# Patient Record
Sex: FEMALE | Race: WHITE | NOT HISPANIC OR LATINO | Employment: FULL TIME | URBAN - NONMETROPOLITAN AREA
[De-identification: names, ages, dates, MRNs, and addresses within clinical notes are randomized per-mention and may not be internally consistent; named-entity substitution may affect disease eponyms.]

---

## 2017-03-17 RX ORDER — LISINOPRIL 5 MG/1
TABLET ORAL
Qty: 90 TABLET | Refills: 3 | Status: SHIPPED | OUTPATIENT
Start: 2017-03-17 | End: 2018-03-24 | Stop reason: SDUPTHER

## 2017-05-31 ENCOUNTER — OFFICE VISIT (OUTPATIENT)
Dept: INTERNAL MEDICINE | Facility: CLINIC | Age: 52
End: 2017-05-31

## 2017-05-31 VITALS
BODY MASS INDEX: 38.79 KG/M2 | TEMPERATURE: 97.7 F | SYSTOLIC BLOOD PRESSURE: 120 MMHG | OXYGEN SATURATION: 97 % | HEIGHT: 64 IN | WEIGHT: 227.19 LBS | RESPIRATION RATE: 16 BRPM | HEART RATE: 88 BPM | DIASTOLIC BLOOD PRESSURE: 82 MMHG

## 2017-05-31 DIAGNOSIS — H65.92 LEFT OTITIS MEDIA WITH EFFUSION: ICD-10-CM

## 2017-05-31 DIAGNOSIS — J30.9 ALLERGIC RHINITIS, UNSPECIFIED ALLERGIC RHINITIS TRIGGER, UNSPECIFIED RHINITIS SEASONALITY: Primary | ICD-10-CM

## 2017-05-31 DIAGNOSIS — J01.90 ACUTE SINUSITIS, RECURRENCE NOT SPECIFIED, UNSPECIFIED LOCATION: ICD-10-CM

## 2017-05-31 PROCEDURE — 99213 OFFICE O/P EST LOW 20 MIN: CPT | Performed by: NURSE PRACTITIONER

## 2017-05-31 RX ORDER — AZITHROMYCIN 500 MG/1
500 TABLET, FILM COATED ORAL DAILY
Qty: 5 TABLET | Refills: 0 | Status: SHIPPED | OUTPATIENT
Start: 2017-05-31 | End: 2017-06-05

## 2018-01-18 ENCOUNTER — TRANSCRIBE ORDERS (OUTPATIENT)
Dept: ADMINISTRATIVE | Facility: HOSPITAL | Age: 53
End: 2018-01-18

## 2018-01-18 DIAGNOSIS — Z12.39 SCREENING BREAST EXAMINATION: Primary | ICD-10-CM

## 2018-02-09 ENCOUNTER — APPOINTMENT (OUTPATIENT)
Dept: MAMMOGRAPHY | Facility: HOSPITAL | Age: 53
End: 2018-02-09

## 2018-03-06 ENCOUNTER — HOSPITAL ENCOUNTER (OUTPATIENT)
Dept: MAMMOGRAPHY | Facility: HOSPITAL | Age: 53
Discharge: HOME | End: 2018-03-06
Admitting: PHYSICIAN ASSISTANT

## 2018-03-06 DIAGNOSIS — Z12.39 SCREENING BREAST EXAMINATION: ICD-10-CM

## 2018-03-06 PROCEDURE — 77063 BREAST TOMOSYNTHESIS BI: CPT

## 2018-03-06 PROCEDURE — 77067 SCR MAMMO BI INCL CAD: CPT

## 2018-03-26 RX ORDER — LISINOPRIL 5 MG/1
TABLET ORAL
Qty: 90 TABLET | Refills: 3 | Status: SHIPPED | OUTPATIENT
Start: 2018-03-26 | End: 2019-06-17 | Stop reason: SDUPTHER

## 2018-03-29 DIAGNOSIS — Z20.828 EXPOSURE TO THE FLU: Primary | ICD-10-CM

## 2018-03-29 RX ORDER — OSELTAMIVIR PHOSPHATE 75 MG/1
75 CAPSULE ORAL DAILY
Qty: 10 CAPSULE | Refills: 0 | Status: SHIPPED | OUTPATIENT
Start: 2018-03-29 | End: 2018-09-07

## 2018-09-07 ENCOUNTER — OFFICE VISIT (OUTPATIENT)
Dept: INTERNAL MEDICINE | Facility: CLINIC | Age: 53
End: 2018-09-07

## 2018-09-07 VITALS
OXYGEN SATURATION: 98 % | DIASTOLIC BLOOD PRESSURE: 62 MMHG | SYSTOLIC BLOOD PRESSURE: 124 MMHG | TEMPERATURE: 97.5 F | HEART RATE: 68 BPM

## 2018-09-07 DIAGNOSIS — F32.A ANXIETY AND DEPRESSION: ICD-10-CM

## 2018-09-07 DIAGNOSIS — F41.9 ANXIETY AND DEPRESSION: ICD-10-CM

## 2018-09-07 DIAGNOSIS — I10 ESSENTIAL HYPERTENSION: ICD-10-CM

## 2018-09-07 DIAGNOSIS — J30.1 CHRONIC SEASONAL ALLERGIC RHINITIS DUE TO POLLEN: ICD-10-CM

## 2018-09-07 DIAGNOSIS — Z00.00 PHYSICAL EXAM, ANNUAL: Primary | ICD-10-CM

## 2018-09-07 DIAGNOSIS — K21.9 GASTROESOPHAGEAL REFLUX DISEASE WITHOUT ESOPHAGITIS: ICD-10-CM

## 2018-09-07 LAB
ALBUMIN SERPL-MCNC: 4.3 G/DL (ref 3.5–5)
ALBUMIN/GLOB SERPL: 1.6 G/DL (ref 1–2)
ALP SERPL-CCNC: 90 U/L (ref 38–126)
ALT SERPL-CCNC: 42 U/L (ref 13–69)
AST SERPL-CCNC: 36 U/L (ref 15–46)
BASOPHILS # BLD AUTO: 0.05 10*3/MM3 (ref 0–0.2)
BASOPHILS NFR BLD AUTO: 0.9 % (ref 0–2.5)
BILIRUB SERPL-MCNC: 0.9 MG/DL (ref 0.2–1.3)
BUN SERPL-MCNC: 15 MG/DL (ref 7–20)
BUN/CREAT SERPL: 25 (ref 7.1–23.5)
CALCIUM SERPL-MCNC: 9.3 MG/DL (ref 8.4–10.2)
CHLORIDE SERPL-SCNC: 104 MMOL/L (ref 98–107)
CHOLEST SERPL-MCNC: 165 MG/DL (ref 0–199)
CHOLEST/HDLC SERPL: 2.23 {RATIO}
CO2 SERPL-SCNC: 25 MMOL/L (ref 26–30)
CREAT SERPL-MCNC: 0.6 MG/DL (ref 0.6–1.3)
EOSINOPHIL # BLD AUTO: 0.17 10*3/MM3 (ref 0–0.7)
EOSINOPHIL NFR BLD AUTO: 3 % (ref 0–7)
ERYTHROCYTE [DISTWIDTH] IN BLOOD BY AUTOMATED COUNT: 12.1 % (ref 11.5–14.5)
GLOBULIN SER CALC-MCNC: 2.7 GM/DL
GLUCOSE SERPL-MCNC: 91 MG/DL (ref 74–98)
HBA1C MFR BLD: 5.3 %
HCT VFR BLD AUTO: 38.1 % (ref 37–47)
HDLC SERPL-MCNC: 74 MG/DL (ref 40–60)
HGB BLD-MCNC: 12.8 G/DL (ref 12–16)
IMM GRANULOCYTES # BLD: 0.02 10*3/MM3 (ref 0–0.06)
IMM GRANULOCYTES NFR BLD: 0.4 % (ref 0–0.6)
LDLC SERPL CALC-MCNC: 62 MG/DL (ref 0–99)
LYMPHOCYTES # BLD AUTO: 2.69 10*3/MM3 (ref 0.6–3.4)
LYMPHOCYTES NFR BLD AUTO: 47.7 % (ref 10–50)
MCH RBC QN AUTO: 29.8 PG (ref 27–31)
MCHC RBC AUTO-ENTMCNC: 33.6 G/DL (ref 30–37)
MCV RBC AUTO: 88.6 FL (ref 81–99)
MONOCYTES # BLD AUTO: 0.37 10*3/MM3 (ref 0–0.9)
MONOCYTES NFR BLD AUTO: 6.6 % (ref 0–12)
NEUTROPHILS # BLD AUTO: 2.34 10*3/MM3 (ref 2–6.9)
NEUTROPHILS NFR BLD AUTO: 41.4 % (ref 37–80)
NRBC BLD AUTO-RTO: 0 /100 WBC (ref 0–0)
PLATELET # BLD AUTO: 233 10*3/MM3 (ref 130–400)
POTASSIUM SERPL-SCNC: 4.4 MMOL/L (ref 3.5–5.1)
PROT SERPL-MCNC: 7 G/DL (ref 6.3–8.2)
RBC # BLD AUTO: 4.3 10*6/MM3 (ref 4.2–5.4)
SODIUM SERPL-SCNC: 137 MMOL/L (ref 137–145)
TRIGL SERPL-MCNC: 143 MG/DL
TSH SERPL DL<=0.005 MIU/L-ACNC: 1.4 MIU/ML (ref 0.47–4.68)
VLDLC SERPL CALC-MCNC: 28.6 MG/DL
WBC # BLD AUTO: 5.64 10*3/MM3 (ref 4.8–10.8)

## 2018-09-07 PROCEDURE — 99396 PREV VISIT EST AGE 40-64: CPT | Performed by: INTERNAL MEDICINE

## 2018-09-07 RX ORDER — METHYLPREDNISOLONE 4 MG/1
TABLET ORAL
COMMUNITY
Start: 2018-06-11 | End: 2018-09-07

## 2018-09-07 RX ORDER — CITALOPRAM 20 MG/1
20 TABLET ORAL DAILY
Qty: 30 TABLET | Refills: 3 | Status: SHIPPED | OUTPATIENT
Start: 2018-09-07 | End: 2019-12-31

## 2018-09-07 NOTE — PROGRESS NOTES
Chief Complaint   Patient presents with   • Annual Exam     Physical only. Pt states she's had a really bad headache and sinus pressure today.     Subjective   Tracie Argueta is a 53 y.o. female.     Pt is here for annual exam today.  PMH of HTN, depression and allergies.   HCM- Mammogram March 2018, colonoscopy 2016.   She has been having allergy sxs recently.  She is taking her xyzal, but has a HA past few days.  Has rinsed with her Netti pot, some bloody discharge, clear discharge.  No EA or ST, her ears are congested.   Her BP is well controlled.  Her home reads run about the same. No CP, SOA, edema.   No current GERD sxs.   She has been having some anxiety at night.  Her ex  committed suicide in July.  She is worried about her daughter.  Has some palpitations.  She may have some mild depression starting.    Her father has some CHF with known history of coronary artery disease and carotid artery disease-status post CABG and carotid endarterectomy.  She is concerned about her heart now due to her palpitations and her fathers illness.   She is not eating a healthy diet.  Is eating a lot of sugar and carbs.  She is walking on occasion.    She sees dentist twice a yr.  Last eye exam was about 5 yrs ago.    She does wear her seatbelt.  Does not text and drive.          The following portions of the patient's history were reviewed and updated as appropriate: allergies, current medications, past family history, past medical history, past social history, past surgical history and problem list.    Review of Systems   Constitutional: Negative for chills and fever.   HENT: Positive for sinus pressure. Negative for ear pain and sore throat.    Respiratory: Negative for shortness of breath.    Cardiovascular: Positive for palpitations. Negative for chest pain and leg swelling.   Gastrointestinal: Negative.    Allergic/Immunologic: Positive for environmental allergies.   Neurological: Positive for headaches.    Psychiatric/Behavioral: Positive for dysphoric mood and sleep disturbance. The patient is nervous/anxious.    All other systems reviewed and are negative.      Objective   /62   Pulse 68   Temp 97.5 °F (36.4 °C)   SpO2 98%   There is no height or weight on file to calculate BMI.  Physical Exam   Constitutional: She is oriented to person, place, and time. She appears well-developed and well-nourished.   Pleasant female, seated in dark room due to current headache   HENT:   Head: Normocephalic and atraumatic.   Right Ear: External ear normal.   Left Ear: External ear normal.   Mouth/Throat: Oropharynx is clear and moist.   Left tympanic membrane is dull and slightly bulging compared to right TM, turbinates pale and boggy with clear rhinorrhea, mild maxillary sinus tenderness   Eyes: Pupils are equal, round, and reactive to light. Conjunctivae and EOM are normal.   Neck: Normal range of motion. Neck supple. No thyromegaly present.   Cardiovascular: Normal rate, regular rhythm, normal heart sounds and intact distal pulses.    No murmur heard.  No carotid bruits noted   Pulmonary/Chest: Effort normal and breath sounds normal. No respiratory distress.   Abdominal: Soft. Bowel sounds are normal.   Musculoskeletal: She exhibits no edema.   Lymphadenopathy:     She has no cervical adenopathy.   Neurological: She is alert and oriented to person, place, and time. No cranial nerve deficit.   Psychiatric: Her behavior is normal. Judgment and thought content normal.   Patient appears slightly anxious today   Nursing note and vitals reviewed.      Assessment/Plan   Tracie Argueta is here today and the following problems have been addressed:      Tracie was seen today for annual exam.    Diagnoses and all orders for this visit:    Physical exam, annual  -     CBC & Differential  -     Comprehensive Metabolic Panel  -     Hemoglobin A1c  -     Lipid Panel With / Chol / HDL Ratio  -     TSH    Essential hypertension  -      CBC & Differential  -     Comprehensive Metabolic Panel    Gastroesophageal reflux disease without esophagitis    Anxiety and depression  -     TSH    Chronic seasonal allergic rhinitis due to pollen    Other orders  -     citalopram (CeleXA) 20 MG tablet; Take 1 tablet by mouth Daily.    Labs as noted  Recommend avoidance of processed foods  Exercise for 30 minutes 5 days a week as tolerated  Recommend annual eye doctor appointment, or as necessary  See your dentist twice a year.  Recommend that you brush teeth twice daily and floss minimum of once daily  Recommend regular seatbelt use  Do not text or use phone while driving  Follow heart healthy/low salt diet  Monitor blood pressure as discussed  Take all medications as prescribed  Start Celexa at 10 mg daily for 1 week, then increase to 20 mg daily for underlying depression and anxiety symptoms  Continue Xyzal daily for allergies, but take Sudafed 30 mg once daily for 3-5 days due to underlying congestion and left ear fullness  Counseled patient that her current palpitations are likely due to underlying anxiety-if symptoms persist despite treatment of anxiety will consider stress test at follow-up visit  Reflux symptoms are currently well controlled with healthy diet    Return to clinic in 6 weeks for follow-up of depression and anxiety        Please note that portions of this note were completed with a voice recognition program.  Efforts were made to edit dictation, but occasionally words are mistranscribed.

## 2018-09-11 ENCOUNTER — TELEPHONE (OUTPATIENT)
Dept: INTERNAL MEDICINE | Facility: CLINIC | Age: 53
End: 2018-09-11

## 2018-09-12 RX ORDER — SULFAMETHOXAZOLE AND TRIMETHOPRIM 800; 160 MG/1; MG/1
1 TABLET ORAL 2 TIMES DAILY
Qty: 14 TABLET | Refills: 0 | Status: SHIPPED | OUTPATIENT
Start: 2018-09-12 | End: 2018-09-19

## 2018-11-13 ENCOUNTER — OFFICE VISIT (OUTPATIENT)
Dept: INTERNAL MEDICINE | Facility: CLINIC | Age: 53
End: 2018-11-13

## 2018-11-13 VITALS
OXYGEN SATURATION: 95 % | HEART RATE: 72 BPM | DIASTOLIC BLOOD PRESSURE: 80 MMHG | TEMPERATURE: 97.4 F | SYSTOLIC BLOOD PRESSURE: 128 MMHG

## 2018-11-13 DIAGNOSIS — F41.9 ANXIETY AND DEPRESSION: Primary | ICD-10-CM

## 2018-11-13 DIAGNOSIS — F32.A ANXIETY AND DEPRESSION: Primary | ICD-10-CM

## 2018-11-13 PROCEDURE — 99213 OFFICE O/P EST LOW 20 MIN: CPT | Performed by: INTERNAL MEDICINE

## 2018-11-13 NOTE — PROGRESS NOTES
Chief Complaint   Patient presents with   • Follow-up     6 weeks for depression and anxiety. Pt states Celexa seems be working good.      Subjective   Tracie Argueta is a 53 y.o. female.     Pt here today for follow up of anxiety and depression.  She was started on celexa 10 mg daily for one week, then up to 20 mg daily.   She is not having any side effects.  She has less anxiety.  If she is around her daughter at times, her anxiety will cause pt to feel more anxious.   She is sleeping well.  Has a good appetite.  She is trying to drink a lot of water.  Becoming more active, trying to exercise.             The following portions of the patient's history were reviewed and updated as appropriate: allergies, current medications, past family history, past medical history, past social history, past surgical history and problem list.    Review of Systems   Constitutional: Negative for activity change and appetite change.   Psychiatric/Behavioral: Negative for dysphoric mood and sleep disturbance. The patient is not nervous/anxious.        Objective   /80   Pulse 72   Temp 97.4 °F (36.3 °C)   SpO2 95%   There is no height or weight on file to calculate BMI.  Physical Exam   Constitutional: She is oriented to person, place, and time. She appears well-developed and well-nourished.   HENT:   Head: Normocephalic and atraumatic.   Eyes: EOM are normal. Pupils are equal, round, and reactive to light.   Pulmonary/Chest: Effort normal.   Neurological: She is alert and oriented to person, place, and time.   Psychiatric: Her behavior is normal. Judgment and thought content normal.   Overall mood is much improved with less anxiety noted today during interview   Nursing note and vitals reviewed.      Assessment/Plan   Tracie Argueta is here today and the following problems have been addressed:      Tracie was seen today for follow-up.    Diagnoses and all orders for this visit:    Anxiety and depression    continue celexa at  current dose  Recommend Hep A and shingrix vaccines at pharmacy    RTC 3 mo for routine visit    Please note that portions of this note were completed with a voice recognition program.  Efforts were made to edit dictation, but occasionally words are mistranscribed.

## 2019-01-08 ENCOUNTER — PROCEDURE VISIT (OUTPATIENT)
Dept: OBSTETRICS AND GYNECOLOGY | Facility: CLINIC | Age: 54
End: 2019-01-08

## 2019-01-08 VITALS
WEIGHT: 250.2 LBS | HEIGHT: 64 IN | SYSTOLIC BLOOD PRESSURE: 146 MMHG | DIASTOLIC BLOOD PRESSURE: 88 MMHG | BODY MASS INDEX: 42.72 KG/M2

## 2019-01-08 DIAGNOSIS — N95.1 MENOPAUSAL STATE: ICD-10-CM

## 2019-01-08 DIAGNOSIS — N90.89 VULVAR IRRITATION: ICD-10-CM

## 2019-01-08 DIAGNOSIS — Z12.72 SPECIAL SCREENING FOR MALIGNANT NEOPLASM OF VAGINA: ICD-10-CM

## 2019-01-08 DIAGNOSIS — Z01.411 ENCOUNTER FOR GYNECOLOGICAL EXAMINATION WITH ABNORMAL FINDING: Primary | ICD-10-CM

## 2019-01-08 PROCEDURE — 99396 PREV VISIT EST AGE 40-64: CPT | Performed by: PHYSICIAN ASSISTANT

## 2019-01-08 RX ORDER — HEPATITIS A VACCINE 1440 [IU]/ML
INJECTION, SUSPENSION INTRAMUSCULAR
Refills: 1 | COMMUNITY
Start: 2018-12-08 | End: 2019-12-31

## 2019-01-08 RX ORDER — CLOBETASOL PROPIONATE 0.5 MG/G
CREAM TOPICAL
Qty: 30 G | Refills: 0 | Status: SHIPPED | OUTPATIENT
Start: 2019-01-08 | End: 2020-08-25

## 2019-01-08 NOTE — PROGRESS NOTES
Subjective   Chief Complaint   Patient presents with   • Gynecologic Exam     Pap is due; MMG is due in March; C/O vaginal dryness       Tracie Argueta is a 53 y.o. year old  presenting to be seen for her annual gynecological exam.   She has had previous hysterectomy and BSO . Did not use HRT post surgery.   She reports her last pap was about 5 years ago.   She has been having some vaginal dryness and dryness with intercourse but not significant at this time. Is not using any lubricants     She also reports right labia has been itchy for about 2 months off and on. Itching occurs about q 2 weeks but very itchy when flares up. No vaginal discharge  Screening mammogram is due in March       Past Medical History:   Diagnosis Date   • Acute otitis media    • Acute sinusitis    • Acute UTI    • Dysfunction of eustachian tube     right    • H/O bone density study    • History of mammogram    • History of Papanicolaou smear of cervix      [place]   • Hypertension    • Meniscus tear    • Pain in thoracic spine    • Postsurgical ovarian failure    • Urinary symptom or sign    • URTI (acute upper respiratory infection)         Current Outpatient Medications:   •  Cholecalciferol (VITAMIN D-3) 1000 UNITS capsule, Take  by mouth daily., Disp: , Rfl:   •  HAVRIX 1440 EL U/ML vaccine, ADMINISTER VACCINE PER PHYSICIAN PROTOCOL, Disp: , Rfl: 1  •  levocetirizine (XYZAL) 5 MG tablet, , Disp: , Rfl:   •  lisinopril (PRINIVIL,ZESTRIL) 5 MG tablet, TAKE 1 TABLET DAILY, Disp: 90 tablet, Rfl: 3  •  MULTIPLE VITAMIN PO, Take  by mouth daily., Disp: , Rfl:   •  citalopram (CeleXA) 20 MG tablet, Take 1 tablet by mouth Daily., Disp: 30 tablet, Rfl: 3  •  clobetasol (TEMOVATE) 0.05 % cream, Apply bid x 1 week then qd x 1 week then qod x 2 weeks, Disp: 30 g, Rfl: 0   Allergies   Allergen Reactions   • Penicillins Rash      Past Surgical History:   Procedure Laterality Date   •  SECTION     • HYSTERECTOMY      Total Abdominal  "  • LAPAROSCOPIC ASSISTED VAGINAL HYSTERECTOMY SALPINGO OOPHORECTOMY     • OOPHORECTOMY        Social History     Socioeconomic History   • Marital status:      Spouse name: Not on file   • Number of children: Not on file   • Years of education: Not on file   • Highest education level: Not on file   Social Needs   • Financial resource strain: Not on file   • Food insecurity - worry: Not on file   • Food insecurity - inability: Not on file   • Transportation needs - medical: Not on file   • Transportation needs - non-medical: Not on file   Occupational History   • Not on file   Tobacco Use   • Smoking status: Never Smoker   • Smokeless tobacco: Never Used   Substance and Sexual Activity   • Alcohol use: No   • Drug use: No   • Sexual activity: Yes     Partners: Male     Birth control/protection: Surgical   Other Topics Concern   • Not on file   Social History Narrative   • Not on file      Family History   Problem Relation Age of Onset   • Cancer Mother    • Colon cancer Mother    • Thyroid disease Mother    • Heart attack Father    • Thyroid disease Brother        Review of Systems   Constitutional: Negative.    Gastrointestinal: Negative.    Genitourinary: Positive for dyspareunia and vaginal pain. Negative for difficulty urinating, pelvic pain and vaginal discharge.           Objective   /88   Ht 162.6 cm (64\")   Wt 113 kg (250 lb 3.2 oz)   Breastfeeding? No   BMI 42.95 kg/m²     Physical Exam   Constitutional: She appears well-developed and well-nourished. She is cooperative.   Pulmonary/Chest: Right breast exhibits no inverted nipple, no mass, no nipple discharge, no skin change and no tenderness. Left breast exhibits no inverted nipple, no mass, no nipple discharge, no skin change and no tenderness.   Abdominal: Soft. Normal appearance. There is no tenderness.   Genitourinary: Uterus normal. There is no tenderness or lesion on the right labia. There is no tenderness or lesion on the left labia. " Cervix exhibits no motion tenderness and no discharge. Right adnexum displays no mass and no tenderness. Left adnexum displays no mass and no tenderness.   Genitourinary Comments: Right labia majora with very faint blanched white discoloration. No lesions or excoriation noted  Vaginal walls atrophic    Neurological: She is alert.   Skin: Skin is warm and dry.   Psychiatric: She has a normal mood and affect. Her behavior is normal.            Assessment and Plan  Tracie was seen today for gynecologic exam.    Diagnoses and all orders for this visit:    Encounter for gynecological examination with abnormal finding  -     Mammo Screening Digital Tomosynthesis Bilateral With CAD; Future    Special screening for malignant neoplasm of vagina  -     Liquid-based Pap Smear, Screening; Future    Menopausal state  -     DEXA Bone Density Axial; Future    Vulvar irritation    Other orders  -     clobetasol (TEMOVATE) 0.05 % cream; Apply bid x 1 week then qd x 1 week then qod x 2 weeks      Patient Instructions   Self breast exam monthly  Annual mammogram  Calcium 1200 mg daily and vit D 2000 mg daily  Regular exercise  Discussed option of vaginal estrogen for vaginal dryness but at this time patient will try OTC lubricants    Suspect lichen sclerosis of right labia clinically so offered patient option of trial of clobetasol cream and she would like to try this. She is advised if vulvar itching not resolved completely with course of clobetasol will discuss possible vulvar punch biopsy    Follow up in 6 weeks              This note was electronically signed.    Tracie Mcmahon PA-C   January 8, 2019

## 2019-01-08 NOTE — PATIENT INSTRUCTIONS
Self breast exam monthly  Annual mammogram  Calcium 1200 mg daily and vit D 2000 mg daily  Regular exercise  Discussed option of vaginal estrogen for vaginal dryness but at this time patient will try OTC lubricants    Suspect lichen sclerosis of right labia clinically so offered patient option of trial of clobetasol cream and she would like to try this. She is advised if vulvar itching not resolved completely with course of clobetasol will discuss possible vulvar punch biopsy    Follow up in 6 weeks

## 2019-01-14 ENCOUNTER — APPOINTMENT (OUTPATIENT)
Dept: BONE DENSITY | Facility: HOSPITAL | Age: 54
End: 2019-01-14

## 2019-01-16 DIAGNOSIS — Z12.72 SPECIAL SCREENING FOR MALIGNANT NEOPLASM OF VAGINA: ICD-10-CM

## 2019-03-21 ENCOUNTER — HOSPITAL ENCOUNTER (OUTPATIENT)
Dept: MAMMOGRAPHY | Facility: HOSPITAL | Age: 54
Discharge: HOME | End: 2019-03-21
Admitting: PHYSICIAN ASSISTANT

## 2019-03-21 DIAGNOSIS — Z01.411 ENCOUNTER FOR GYNECOLOGICAL EXAMINATION WITH ABNORMAL FINDING: ICD-10-CM

## 2019-03-21 PROCEDURE — 77067 SCR MAMMO BI INCL CAD: CPT

## 2019-03-21 PROCEDURE — 77063 BREAST TOMOSYNTHESIS BI: CPT

## 2019-04-02 ENCOUNTER — TELEPHONE (OUTPATIENT)
Dept: OBSTETRICS AND GYNECOLOGY | Facility: CLINIC | Age: 54
End: 2019-04-02

## 2019-04-02 NOTE — TELEPHONE ENCOUNTER
Tried to contact patient regarding overdue DEXA. No answer, left message for patient to call office and order postponed.

## 2019-04-18 NOTE — TELEPHONE ENCOUNTER
Pt called back and states, she works at [facility] and had DEXA done there as part of her wellness program. They would not provide a paper copy for her but she was able to take a picture of it with her phone. I suggested to pt she upload the picture through a message on [employer] and we could forward it to Zoya to be reviewed.

## 2019-06-17 RX ORDER — LISINOPRIL 5 MG/1
5 TABLET ORAL DAILY
Qty: 90 TABLET | Refills: 0 | Status: SHIPPED | OUTPATIENT
Start: 2019-06-17 | End: 2019-11-15 | Stop reason: SDUPTHER

## 2019-11-15 RX ORDER — LISINOPRIL 5 MG/1
5 TABLET ORAL DAILY
Qty: 90 TABLET | Refills: 0 | Status: SHIPPED | OUTPATIENT
Start: 2019-11-15 | End: 2020-02-14

## 2019-12-31 ENCOUNTER — OFFICE VISIT (OUTPATIENT)
Dept: INTERNAL MEDICINE | Facility: CLINIC | Age: 54
End: 2019-12-31

## 2019-12-31 VITALS
DIASTOLIC BLOOD PRESSURE: 80 MMHG | TEMPERATURE: 97.6 F | OXYGEN SATURATION: 96 % | SYSTOLIC BLOOD PRESSURE: 128 MMHG | HEIGHT: 64 IN | HEART RATE: 82 BPM | BODY MASS INDEX: 39.44 KG/M2 | WEIGHT: 231 LBS

## 2019-12-31 DIAGNOSIS — I10 ESSENTIAL HYPERTENSION: ICD-10-CM

## 2019-12-31 DIAGNOSIS — M17.12 PRIMARY OSTEOARTHRITIS OF LEFT KNEE: ICD-10-CM

## 2019-12-31 DIAGNOSIS — E66.01 MORBID OBESITY (HCC): ICD-10-CM

## 2019-12-31 DIAGNOSIS — F34.1 DYSTHYMIA: ICD-10-CM

## 2019-12-31 DIAGNOSIS — Z00.00 PHYSICAL EXAM, ANNUAL: Primary | ICD-10-CM

## 2019-12-31 PROBLEM — F41.9 ANXIETY AND DEPRESSION: Status: RESOLVED | Noted: 2018-09-07 | Resolved: 2019-12-31

## 2019-12-31 PROBLEM — F32.A ANXIETY AND DEPRESSION: Status: RESOLVED | Noted: 2018-09-07 | Resolved: 2019-12-31

## 2019-12-31 LAB
ALBUMIN SERPL-MCNC: 4.4 G/DL (ref 3.5–5.2)
ALBUMIN/GLOB SERPL: 1.6 G/DL
ALP SERPL-CCNC: 92 U/L (ref 39–117)
ALT SERPL-CCNC: 21 U/L (ref 1–33)
AST SERPL-CCNC: 24 U/L (ref 1–32)
BASOPHILS # BLD AUTO: 0.03 10*3/MM3 (ref 0–0.2)
BASOPHILS NFR BLD AUTO: 0.8 % (ref 0–1.5)
BILIRUB SERPL-MCNC: 0.8 MG/DL (ref 0.2–1.2)
BUN SERPL-MCNC: 14 MG/DL (ref 6–20)
BUN/CREAT SERPL: 21.9 (ref 7–25)
CALCIUM SERPL-MCNC: 9.2 MG/DL (ref 8.6–10.5)
CHLORIDE SERPL-SCNC: 101 MMOL/L (ref 98–107)
CHOLEST SERPL-MCNC: 180 MG/DL (ref 0–200)
CHOLEST/HDLC SERPL: 2.54 {RATIO}
CO2 SERPL-SCNC: 25.3 MMOL/L (ref 22–29)
CREAT SERPL-MCNC: 0.64 MG/DL (ref 0.57–1)
EOSINOPHIL # BLD AUTO: 0.08 10*3/MM3 (ref 0–0.4)
EOSINOPHIL NFR BLD AUTO: 2.2 % (ref 0.3–6.2)
ERYTHROCYTE [DISTWIDTH] IN BLOOD BY AUTOMATED COUNT: 12.6 % (ref 12.3–15.4)
GLOBULIN SER CALC-MCNC: 2.7 GM/DL
GLUCOSE SERPL-MCNC: 96 MG/DL (ref 65–99)
HCT VFR BLD AUTO: 36.8 % (ref 34–46.6)
HDLC SERPL-MCNC: 71 MG/DL (ref 40–60)
HGB BLD-MCNC: 12.8 G/DL (ref 12–15.9)
IMM GRANULOCYTES # BLD AUTO: 0.01 10*3/MM3 (ref 0–0.05)
IMM GRANULOCYTES NFR BLD AUTO: 0.3 % (ref 0–0.5)
LDLC SERPL CALC-MCNC: 79 MG/DL (ref 0–100)
LYMPHOCYTES # BLD AUTO: 1.39 10*3/MM3 (ref 0.7–3.1)
LYMPHOCYTES NFR BLD AUTO: 37.9 % (ref 19.6–45.3)
MCH RBC QN AUTO: 30.7 PG (ref 26.6–33)
MCHC RBC AUTO-ENTMCNC: 34.8 G/DL (ref 31.5–35.7)
MCV RBC AUTO: 88.2 FL (ref 79–97)
MONOCYTES # BLD AUTO: 0.24 10*3/MM3 (ref 0.1–0.9)
MONOCYTES NFR BLD AUTO: 6.5 % (ref 5–12)
NEUTROPHILS # BLD AUTO: 1.92 10*3/MM3 (ref 1.7–7)
NEUTROPHILS NFR BLD AUTO: 52.3 % (ref 42.7–76)
NRBC BLD AUTO-RTO: 0 /100 WBC (ref 0–0.2)
PLATELET # BLD AUTO: 239 10*3/MM3 (ref 140–450)
POTASSIUM SERPL-SCNC: 4.7 MMOL/L (ref 3.5–5.2)
PROT SERPL-MCNC: 7.1 G/DL (ref 6–8.5)
RBC # BLD AUTO: 4.17 10*6/MM3 (ref 3.77–5.28)
SODIUM SERPL-SCNC: 140 MMOL/L (ref 136–145)
TRIGL SERPL-MCNC: 148 MG/DL (ref 0–150)
VLDLC SERPL CALC-MCNC: 29.6 MG/DL
WBC # BLD AUTO: 3.67 10*3/MM3 (ref 3.4–10.8)

## 2019-12-31 PROCEDURE — 90471 IMMUNIZATION ADMIN: CPT | Performed by: INTERNAL MEDICINE

## 2019-12-31 PROCEDURE — 90686 IIV4 VACC NO PRSV 0.5 ML IM: CPT | Performed by: INTERNAL MEDICINE

## 2019-12-31 PROCEDURE — 99396 PREV VISIT EST AGE 40-64: CPT | Performed by: INTERNAL MEDICINE

## 2019-12-31 RX ORDER — DULOXETIN HYDROCHLORIDE 30 MG/1
30 CAPSULE, DELAYED RELEASE ORAL DAILY
Qty: 30 CAPSULE | Refills: 2 | Status: SHIPPED | OUTPATIENT
Start: 2019-12-31 | End: 2019-12-31

## 2019-12-31 RX ORDER — DULOXETIN HYDROCHLORIDE 30 MG/1
30 CAPSULE, DELAYED RELEASE ORAL DAILY
Qty: 30 CAPSULE | Refills: 2 | Status: SHIPPED | OUTPATIENT
Start: 2019-12-31 | End: 2020-02-13

## 2019-12-31 RX ORDER — MELOXICAM 5 MG/1
CAPSULE ORAL DAILY
COMMUNITY
End: 2020-03-06
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
## 2020-02-13 ENCOUNTER — OFFICE VISIT (OUTPATIENT)
Dept: INTERNAL MEDICINE | Facility: CLINIC | Age: 55
End: 2020-02-13

## 2020-02-13 VITALS
DIASTOLIC BLOOD PRESSURE: 82 MMHG | HEART RATE: 83 BPM | SYSTOLIC BLOOD PRESSURE: 138 MMHG | WEIGHT: 227 LBS | HEIGHT: 64 IN | TEMPERATURE: 98.1 F | BODY MASS INDEX: 38.76 KG/M2 | OXYGEN SATURATION: 98 %

## 2020-02-13 DIAGNOSIS — F34.1 DYSTHYMIA: ICD-10-CM

## 2020-02-13 DIAGNOSIS — M17.12 PRIMARY OSTEOARTHRITIS OF LEFT KNEE: Primary | ICD-10-CM

## 2020-02-13 PROCEDURE — 99213 OFFICE O/P EST LOW 20 MIN: CPT | Performed by: INTERNAL MEDICINE

## 2020-02-13 RX ORDER — DULOXETIN HYDROCHLORIDE 30 MG/1
30 CAPSULE, DELAYED RELEASE ORAL DAILY
Qty: 90 CAPSULE | Refills: 1 | Status: SHIPPED | OUTPATIENT
Start: 2020-02-13 | End: 2020-09-09

## 2020-02-13 NOTE — PROGRESS NOTES
"Chief Complaint   Patient presents with   • Follow-up     6 weeks for underlying arthritis, back pain and intermittent dysthymia     Subjective   Tracie Argueta is a 54 y.o. female.     Here today for follow up of DJD and dysthymia.   Last visit we started her on duloxetine 30 mg daily for above issues.   After last visit she developed shingles on her scalp.   She started the duloxetine and cannot tell if it has been helpful for her knee or back pain. She feels the medication is helping her depression-less sadness and anxiety.    She is seeing [facility] Dr Nielsen.  She had SI joint injections and epidural and it did not help.   She has appt with Dr Beltrán and has appt for another procedure, sounds like a rhizotomy for her spinal stenosis.    She is having a knee injection per Dr Lei.         The following portions of the patient's history were reviewed and updated as appropriate: allergies, current medications, past family history, past medical history, past social history, past surgical history and problem list.    Review of Systems   Constitutional: Positive for fatigue. Negative for activity change, appetite change and unexpected weight change.   Musculoskeletal: Positive for arthralgias, back pain and gait problem.   Psychiatric/Behavioral: Negative for dysphoric mood, sleep disturbance and suicidal ideas. The patient is not nervous/anxious.        Objective   /82   Pulse 83   Temp 98.1 °F (36.7 °C)   Ht 162.6 cm (64\")   Wt 103 kg (227 lb)   SpO2 98%   BMI 38.96 kg/m²   Body mass index is 38.96 kg/m².  Physical Exam   Constitutional: She is oriented to person, place, and time. She appears well-developed and well-nourished. No distress.   Very pleasant female, NAD today   Eyes: EOM are normal.   Pulmonary/Chest: Effort normal.   Musculoskeletal:   Antalgic gait due to back and knee pain   Neurological: She is alert and oriented to person, place, and time.   Psychiatric: She has a normal " mood and affect. Her behavior is normal. Judgment and thought content normal.   Nursing note and vitals reviewed.      Assessment/Plan   Tracie Argueta is here today and the following problems have been addressed:      Tracie was seen today for follow-up.    Diagnoses and all orders for this visit:    Primary osteoarthritis of left knee    Dysthymia    Other orders  -     DULoxetine (CYMBALTA) 30 MG capsule; Take 1 capsule by mouth Daily.    Continue duloxetine 30 mg daily for DJD and dysthymia  She prefers to continue current dose of medication as her mood is better/stable  She does not feel a higher dose will improve her pain and I tend to agree  She will see pain clinic for procedure on her back and Dr Lei for discussion for possible left TKR    RTC 3 mo or prn    Please note that portions of this note were completed with a voice recognition program.  Efforts were made to edit dictation, but occasionally words are mistranscribed.

## 2020-02-14 RX ORDER — LISINOPRIL 5 MG/1
5 TABLET ORAL DAILY
Qty: 90 TABLET | Refills: 1 | Status: SHIPPED | OUTPATIENT
Start: 2020-02-14 | End: 2020-08-12

## 2020-03-06 ENCOUNTER — OFFICE VISIT (OUTPATIENT)
Dept: INTERNAL MEDICINE | Facility: CLINIC | Age: 55
End: 2020-03-06

## 2020-03-06 VITALS
TEMPERATURE: 97.7 F | SYSTOLIC BLOOD PRESSURE: 130 MMHG | HEIGHT: 64 IN | WEIGHT: 227 LBS | HEART RATE: 88 BPM | BODY MASS INDEX: 38.76 KG/M2 | DIASTOLIC BLOOD PRESSURE: 80 MMHG | OXYGEN SATURATION: 98 %

## 2020-03-06 DIAGNOSIS — Z01.818 PRE-OP EXAM: Primary | ICD-10-CM

## 2020-03-06 PROCEDURE — 93000 ELECTROCARDIOGRAM COMPLETE: CPT | Performed by: INTERNAL MEDICINE

## 2020-03-06 PROCEDURE — 99214 OFFICE O/P EST MOD 30 MIN: CPT | Performed by: INTERNAL MEDICINE

## 2020-03-06 NOTE — PROGRESS NOTES
" Subjective:      Tracie Argueta is a 54 y.o. female who presents to the office today for a preoperative consultation at the request of surgeon Dr Lei who plans on performing left partial TKR on April 10. This consultation is requested for the specific conditions prompting preoperative evaluation general medical clearance. Planned anesthesia is general. The patient has the following known anesthesia issues: none. Patient has a bleeding risk of: no recent abnormal bleeding. Patient does not have objections to receiving blood products if needed.    The following portions of the patient's history were reviewed and updated as appropriate: allergies, current medications, past family history, past medical history, past social history, past surgical history and problem list.    Review of Systems  A comprehensive review of systems was negative except for: Gastrointestinal: positive for constipation  Musculoskeletal: positive for arthralgias       Objective:      Physical Exam  /80   Pulse 88   Temp 97.7 °F (36.5 °C)   Ht 162.6 cm (64\")   Wt 103 kg (227 lb)   SpO2 98%   BMI 38.96 kg/m²   General appearance: alert, appears stated age and cooperative  Eyes: conjunctivae/corneas clear. PERRL, EOM's intact. Fundi benign.  Ears: normal TM's and external ear canals both ears  Throat: lips, mucosa, and tongue normal; teeth and gums normal  Neck: no adenopathy, no carotid bruit, supple, symmetrical, trachea midline and thyroid not enlarged, symmetric, no tenderness/mass/nodules  Lungs: clear to auscultation bilaterally  Heart: regular rate and rhythm, S1, S2 normal, no murmur, click, rub or gallop  Abdomen: soft, non-tender; bowel sounds normal; no masses,  no organomegaly  Extremities: extremities normal, atraumatic, no cyanosis or edema  Pulses: 2+ and symmetric  Lymph nodes: Cervical, supraclavicular, and axillary nodes normal.  Neurologic: Grossly normal    Predictors of intubation " difficulty:  none    Cardiographics    ECG 12 Lead  Date/Time: 3/6/2020 5:24 PM  Performed by: Vermeesch, Marilyn K, MD  Authorized by: Vermeesch, Marilyn K, MD   Comparison: not compared with previous ECG   Rhythm: sinus rhythm  Rate: normal  BPM: 85  Conduction: conduction normal  ST Segments: ST segments normal  T Waves: T waves normal  QRS axis: normal  Other: no other findings    Clinical impression: normal ECG              Imaging  See attached    Lab Review   See attached     Assessment:      54 y.o. female with planned surgery as above.    Known risk factors for perioperative complications: None    Difficulty with intubation is not anticipated.    Cardiac Risk Estimation: per the Revised Cardiac Risk Index (Circ. 100:1043, 1999), the patient's risk factors for cardiac complications include none, putting her in: RCI RISK CLASS I (0 risk factors, risk of major cardiac compl. appr. 0.5%)    Current medications which may produce withdrawal symptoms if withheld perioperatively: duloxetine    Plan:      1. Preoperative workup as follows Labs, EKG and CXR  2. Change in medication regimen before surgery: stop all herbs, vitamins for one week before surgery  3. Prophylaxis for cardiac events with perioperative beta-blockers: not needed  4. Invasive hemodynamic monitoring perioperatively: per surgical team  5. Deep vein thrombosis prophylaxis postoperatively:regimen to be chosen by surgical team.  6. Surveillance for postoperative MI with ECG immediately postoperatively and on postoperative days 1 and 2 AND troponin levels 24 hours postoperatively and on day 4 or hospital discharge (whichever comes first): as indicated

## 2020-03-10 ENCOUNTER — CLINICAL SUPPORT (OUTPATIENT)
Dept: INTERNAL MEDICINE | Facility: CLINIC | Age: 55
End: 2020-03-10

## 2020-03-10 ENCOUNTER — HOSPITAL ENCOUNTER (OUTPATIENT)
Dept: GENERAL RADIOLOGY | Facility: HOSPITAL | Age: 55
Discharge: HOME | End: 2020-03-10
Admitting: INTERNAL MEDICINE

## 2020-03-10 DIAGNOSIS — Z01.818 PRE-OP EXAM: ICD-10-CM

## 2020-03-10 LAB
BILIRUB BLD-MCNC: NEGATIVE MG/DL
CLARITY, POC: CLEAR
COLOR UR: YELLOW
GLUCOSE UR STRIP-MCNC: NEGATIVE MG/DL
KETONES UR QL: NEGATIVE
LEUKOCYTE EST, POC: NEGATIVE
NITRITE UR-MCNC: NEGATIVE MG/ML
PH UR: 6 [PH] (ref 5–8)
PROT UR STRIP-MCNC: NEGATIVE MG/DL
RBC # UR STRIP: NEGATIVE /UL
SP GR UR: 1.01 (ref 1–1.03)
UROBILINOGEN UR QL: NORMAL

## 2020-03-10 PROCEDURE — 71046 X-RAY EXAM CHEST 2 VIEWS: CPT

## 2020-03-10 PROCEDURE — 81003 URINALYSIS AUTO W/O SCOPE: CPT | Performed by: INTERNAL MEDICINE

## 2020-03-11 LAB
ALBUMIN SERPL-MCNC: 4.5 G/DL (ref 3.5–5.2)
ALBUMIN/GLOB SERPL: 1.8 G/DL
ALP SERPL-CCNC: 96 U/L (ref 39–117)
ALT SERPL-CCNC: 17 U/L (ref 1–33)
APTT PPP: 28.5 SECONDS (ref 24.5–37.2)
AST SERPL-CCNC: 18 U/L (ref 1–32)
BASOPHILS # BLD AUTO: 0.04 10*3/MM3 (ref 0–0.2)
BASOPHILS NFR BLD AUTO: 1.1 % (ref 0–1.5)
BILIRUB SERPL-MCNC: 0.9 MG/DL (ref 0.2–1.2)
BUN SERPL-MCNC: 14 MG/DL (ref 6–20)
BUN/CREAT SERPL: 20.9 (ref 7–25)
CALCIUM SERPL-MCNC: 9.2 MG/DL (ref 8.6–10.5)
CHLORIDE SERPL-SCNC: 99 MMOL/L (ref 98–107)
CO2 SERPL-SCNC: 24.6 MMOL/L (ref 22–29)
CREAT SERPL-MCNC: 0.67 MG/DL (ref 0.57–1)
EOSINOPHIL # BLD AUTO: 0.13 10*3/MM3 (ref 0–0.4)
EOSINOPHIL NFR BLD AUTO: 3.7 % (ref 0.3–6.2)
ERYTHROCYTE [DISTWIDTH] IN BLOOD BY AUTOMATED COUNT: 12.5 % (ref 12.3–15.4)
GLOBULIN SER CALC-MCNC: 2.5 GM/DL
GLUCOSE SERPL-MCNC: 111 MG/DL (ref 65–99)
HBA1C MFR BLD: 5.1 % (ref 4.8–5.6)
HCT VFR BLD AUTO: 37.9 % (ref 34–46.6)
HGB BLD-MCNC: 13.2 G/DL (ref 12–15.9)
IMM GRANULOCYTES # BLD AUTO: 0.01 10*3/MM3 (ref 0–0.05)
IMM GRANULOCYTES NFR BLD AUTO: 0.3 % (ref 0–0.5)
INR PPP: 0.91 (ref 0.9–1.1)
LYMPHOCYTES # BLD AUTO: 1.62 10*3/MM3 (ref 0.7–3.1)
LYMPHOCYTES NFR BLD AUTO: 45.9 % (ref 19.6–45.3)
MCH RBC QN AUTO: 30.8 PG (ref 26.6–33)
MCHC RBC AUTO-ENTMCNC: 34.8 G/DL (ref 31.5–35.7)
MCV RBC AUTO: 88.3 FL (ref 79–97)
MONOCYTES # BLD AUTO: 0.25 10*3/MM3 (ref 0.1–0.9)
MONOCYTES NFR BLD AUTO: 7.1 % (ref 5–12)
NEUTROPHILS # BLD AUTO: 1.48 10*3/MM3 (ref 1.7–7)
NEUTROPHILS NFR BLD AUTO: 41.9 % (ref 42.7–76)
NRBC BLD AUTO-RTO: 0 /100 WBC (ref 0–0.2)
PLATELET # BLD AUTO: 248 10*3/MM3 (ref 140–450)
POTASSIUM SERPL-SCNC: 4.8 MMOL/L (ref 3.5–5.2)
PREALB SERPL-MCNC: 25 MG/DL (ref 10–36)
PROT SERPL-MCNC: 7 G/DL (ref 6–8.5)
PROTHROMBIN TIME: 12.5 SECONDS (ref 12–15.1)
RBC # BLD AUTO: 4.29 10*6/MM3 (ref 3.77–5.28)
SODIUM SERPL-SCNC: 137 MMOL/L (ref 136–145)
WBC # BLD AUTO: 3.53 10*3/MM3 (ref 3.4–10.8)

## 2020-03-12 NOTE — PROGRESS NOTES
Please tell patient preop chest x-ray is in acceptable range.  She has some old scarring from previous infections.

## 2020-03-12 NOTE — PROGRESS NOTES
Please tell patient all preop labs are in acceptable range.  I do not see a urinalysis done though.  Please call her back and tell her she needs to come up and do a urinalysis for us.

## 2020-05-01 ENCOUNTER — TELEPHONE (OUTPATIENT)
Dept: INTERNAL MEDICINE | Facility: CLINIC | Age: 55
End: 2020-05-01

## 2020-05-01 ENCOUNTER — CLINICAL SUPPORT (OUTPATIENT)
Dept: INTERNAL MEDICINE | Facility: CLINIC | Age: 55
End: 2020-05-01

## 2020-05-01 DIAGNOSIS — E66.01 MORBID OBESITY (HCC): ICD-10-CM

## 2020-05-01 DIAGNOSIS — Z01.818 PRE-OP EXAM: ICD-10-CM

## 2020-05-01 DIAGNOSIS — Z01.818 PRE-OP EXAM: Primary | ICD-10-CM

## 2020-05-01 LAB
BILIRUB BLD-MCNC: NEGATIVE MG/DL
CLARITY, POC: CLEAR
COLOR UR: YELLOW
GLUCOSE UR STRIP-MCNC: NEGATIVE MG/DL
KETONES UR QL: NEGATIVE
LEUKOCYTE EST, POC: NEGATIVE
NITRITE UR-MCNC: NEGATIVE MG/ML
PH UR: 6 [PH] (ref 5–8)
PROT UR STRIP-MCNC: NEGATIVE MG/DL
RBC # UR STRIP: NEGATIVE /UL
SP GR UR: 1.02 (ref 1–1.03)
UROBILINOGEN UR QL: NORMAL

## 2020-05-01 PROCEDURE — 81003 URINALYSIS AUTO W/O SCOPE: CPT | Performed by: INTERNAL MEDICINE

## 2020-05-01 NOTE — TELEPHONE ENCOUNTER
Pt called to see if we received lab orders for surgery. Pt did a Pre-Op visit with us back in March but her labs are past their preferred date range. Pt is needing to repeat these labs and UA. Orders pended.     Pt is coming in today for labs as her surgery is next Friday.

## 2020-05-02 LAB
ALBUMIN SERPL-MCNC: 4.9 G/DL (ref 3.5–5.2)
ALBUMIN/GLOB SERPL: 1.9 G/DL
ALP SERPL-CCNC: 90 U/L (ref 39–117)
ALT SERPL-CCNC: 15 U/L (ref 1–33)
APTT PPP: 29 SECONDS (ref 24.5–37.2)
AST SERPL-CCNC: 20 U/L (ref 1–32)
BASOPHILS # BLD AUTO: 0.04 10*3/MM3 (ref 0–0.2)
BASOPHILS NFR BLD AUTO: 0.9 % (ref 0–1.5)
BILIRUB SERPL-MCNC: 0.8 MG/DL (ref 0.2–1.2)
BUN SERPL-MCNC: 13 MG/DL (ref 6–20)
BUN/CREAT SERPL: 19.1 (ref 7–25)
CALCIUM SERPL-MCNC: 9.5 MG/DL (ref 8.6–10.5)
CHLORIDE SERPL-SCNC: 97 MMOL/L (ref 98–107)
CO2 SERPL-SCNC: 25.9 MMOL/L (ref 22–29)
CREAT SERPL-MCNC: 0.68 MG/DL (ref 0.57–1)
EOSINOPHIL # BLD AUTO: 0.12 10*3/MM3 (ref 0–0.4)
EOSINOPHIL NFR BLD AUTO: 2.7 % (ref 0.3–6.2)
ERYTHROCYTE [DISTWIDTH] IN BLOOD BY AUTOMATED COUNT: 12.1 % (ref 12.3–15.4)
GLOBULIN SER CALC-MCNC: 2.6 GM/DL
GLUCOSE SERPL-MCNC: 103 MG/DL (ref 65–99)
HBA1C MFR BLD: 4.9 % (ref 4.8–5.6)
HCT VFR BLD AUTO: 37.3 % (ref 34–46.6)
HGB BLD-MCNC: 12.7 G/DL (ref 12–15.9)
IMM GRANULOCYTES # BLD AUTO: 0.02 10*3/MM3 (ref 0–0.05)
IMM GRANULOCYTES NFR BLD AUTO: 0.4 % (ref 0–0.5)
INR PPP: 0.94 (ref 0.9–1.1)
LYMPHOCYTES # BLD AUTO: 1.98 10*3/MM3 (ref 0.7–3.1)
LYMPHOCYTES NFR BLD AUTO: 44.2 % (ref 19.6–45.3)
MCH RBC QN AUTO: 30 PG (ref 26.6–33)
MCHC RBC AUTO-ENTMCNC: 34 G/DL (ref 31.5–35.7)
MCV RBC AUTO: 88.2 FL (ref 79–97)
MONOCYTES # BLD AUTO: 0.28 10*3/MM3 (ref 0.1–0.9)
MONOCYTES NFR BLD AUTO: 6.3 % (ref 5–12)
NEUTROPHILS # BLD AUTO: 2.04 10*3/MM3 (ref 1.7–7)
NEUTROPHILS NFR BLD AUTO: 45.5 % (ref 42.7–76)
NRBC BLD AUTO-RTO: 0 /100 WBC (ref 0–0.2)
PLATELET # BLD AUTO: 256 10*3/MM3 (ref 140–450)
POTASSIUM SERPL-SCNC: 4.6 MMOL/L (ref 3.5–5.2)
PREALB SERPL-MCNC: 24 MG/DL (ref 10–36)
PROT SERPL-MCNC: 7.5 G/DL (ref 6–8.5)
PROTHROMBIN TIME: 12.9 SECONDS (ref 12–15.1)
RBC # BLD AUTO: 4.23 10*6/MM3 (ref 3.77–5.28)
SODIUM SERPL-SCNC: 137 MMOL/L (ref 136–145)
WBC # BLD AUTO: 4.48 10*3/MM3 (ref 3.4–10.8)

## 2020-05-02 NOTE — TELEPHONE ENCOUNTER
Labs are all in acceptable range for surgery, please attach this and UA result to her paperwork and forward to surgeon for clearance, TY

## 2020-08-12 RX ORDER — LISINOPRIL 5 MG/1
TABLET ORAL
Qty: 90 TABLET | Refills: 1 | Status: SHIPPED | OUTPATIENT
Start: 2020-08-12 | End: 2021-02-08

## 2020-08-25 ENCOUNTER — OFFICE VISIT (OUTPATIENT)
Dept: OBSTETRICS AND GYNECOLOGY | Facility: CLINIC | Age: 55
End: 2020-08-25

## 2020-08-25 VITALS
DIASTOLIC BLOOD PRESSURE: 82 MMHG | BODY MASS INDEX: 37.56 KG/M2 | HEIGHT: 64 IN | SYSTOLIC BLOOD PRESSURE: 140 MMHG | WEIGHT: 220 LBS

## 2020-08-25 DIAGNOSIS — N89.8 VAGINAL DISCHARGE: ICD-10-CM

## 2020-08-25 DIAGNOSIS — N95.2 VAGINAL ATROPHY: ICD-10-CM

## 2020-08-25 DIAGNOSIS — N89.8 VAGINAL ITCHING: Primary | ICD-10-CM

## 2020-08-25 PROCEDURE — 99213 OFFICE O/P EST LOW 20 MIN: CPT | Performed by: PHYSICIAN ASSISTANT

## 2020-08-25 NOTE — PROGRESS NOTES
Subjective   Chief Complaint   Patient presents with   • Vaginal Discharge     C/O vaginal discharge and itching       Tracie Argueta is a 55 y.o. year old  presenting to be seen for complaint of vaginal discharge and itching which started Thursday.  Discharge has been thin and clear. She used Monistat externally only for a couple days to help with itching  Previous hysterectomy and bilateral salpingo-oophorectomy and has not used HRT     Past Medical History:   Diagnosis Date   • Acute otitis media    • Acute sinusitis    • Acute UTI    • Dysfunction of eustachian tube     right    • H/O bone density study    • History of mammogram    • History of Papanicolaou smear of cervix      [place]   • Hypertension    • Meniscus tear    • Pain in thoracic spine    • Postsurgical ovarian failure    • Spinal stenosis    • Urinary symptom or sign    • URTI (acute upper respiratory infection)         Current Outpatient Medications:   •  Cholecalciferol (VITAMIN D-3) 1000 UNITS capsule, Take  by mouth daily., Disp: , Rfl:   •  DULoxetine (CYMBALTA) 30 MG capsule, Take 1 capsule by mouth Daily., Disp: 90 capsule, Rfl: 1  •  levocetirizine (XYZAL) 5 MG tablet, , Disp: , Rfl:   •  lisinopril (PRINIVIL,ZESTRIL) 5 MG tablet, TAKE 1 TABLET DAILY, Disp: 90 tablet, Rfl: 1  •  MULTIPLE VITAMIN PO, Take  by mouth daily., Disp: , Rfl:   •  conjugated estrogens (Premarin) 0.625 MG/GM vaginal cream, Use 0.5 grams intravaginally 2 times per week to control symptoms., Disp: 30 g, Rfl: 3   Allergies   Allergen Reactions   • Penicillins Rash      Past Surgical History:   Procedure Laterality Date   •  SECTION     • HYSTERECTOMY      Total Abdominal   • KNEE SURGERY Left    • LAPAROSCOPIC ASSISTED VAGINAL HYSTERECTOMY SALPINGO OOPHORECTOMY     • OOPHORECTOMY        Social History     Socioeconomic History   • Marital status:      Spouse name: Not on file   • Number of children: Not on file   • Years of education: Not on file  "  • Highest education level: Not on file   Tobacco Use   • Smoking status: Never Smoker   • Smokeless tobacco: Never Used   Substance and Sexual Activity   • Alcohol use: No   • Drug use: No   • Sexual activity: Yes     Partners: Male     Birth control/protection: Surgical      Family History   Problem Relation Age of Onset   • Cancer Mother    • Colon cancer Mother    • Thyroid disease Mother    • Heart attack Father    • Thyroid disease Brother        Review of Systems   Constitutional: Negative for chills, fatigue and fever.   Gastrointestinal: Negative for abdominal pain, nausea and vomiting.   Genitourinary: Positive for dyspareunia and vaginal discharge. Negative for difficulty urinating, dysuria, menstrual problem and pelvic pain.   Musculoskeletal: Negative.            Objective   /82   Ht 162.6 cm (64\")   Wt 99.8 kg (220 lb)   Breastfeeding No   BMI 37.76 kg/m²     Physical Exam   Constitutional: She appears well-developed and well-nourished. She is cooperative. No distress.   Eyes: Conjunctivae, EOM and lids are normal.   Abdominal: Soft. Normal appearance. There is no tenderness. There is no rigidity and no guarding.   Genitourinary: There is no tenderness or lesion on the right labia. There is no tenderness or lesion on the left labia. Right adnexum displays no mass and no tenderness. Left adnexum displays no mass and no tenderness. No erythema or tenderness in the vagina. No vaginal discharge found.   Genitourinary Comments: tissue atrophic  Thin clear discharge   Musculoskeletal: Normal range of motion.   Neurological: She is alert.   Skin: Skin is warm and dry. No lesion and no rash noted.   Psychiatric: She has a normal mood and affect. Her behavior is normal. Thought content normal.            Assessment and Plan  Tracie was seen today for vaginal discharge.    Diagnoses and all orders for this visit:    Vaginal itching  -     NuSwab VG+ - Swab, Vagina    Vaginal discharge  -     NuSwab " VG+ - Swab, Vagina    Vaginal atrophy    Other orders  -     conjugated estrogens (Premarin) 0.625 MG/GM vaginal cream; Use 0.5 grams intravaginally 2 times per week to control symptoms.      Patient Instructions   VG plus swab done and patient will be contacted with results when available  Recommend trial vaginal estrogen cream and patient desires to try this. Allow 6 weeks to see improvement in vaginal dryness  Follow up prn             This note was electronically signed.    Tracie Mcmahon PA-C   August 25, 2020

## 2020-08-25 NOTE — PATIENT INSTRUCTIONS
VG plus swab done and patient will be contacted with results when available  Recommend trial vaginal estrogen cream and patient desires to try this. Allow 6 weeks to see improvement in vaginal dryness  Follow up prn

## 2020-08-28 LAB
A VAGINAE DNA VAG QL NAA+PROBE: NORMAL SCORE
BVAB2 DNA VAG QL NAA+PROBE: NORMAL SCORE
C ALBICANS DNA VAG QL NAA+PROBE: NEGATIVE
C GLABRATA DNA VAG QL NAA+PROBE: NEGATIVE
C TRACH DNA VAG QL NAA+PROBE: NEGATIVE
MEGA1 DNA VAG QL NAA+PROBE: NORMAL SCORE
N GONORRHOEA DNA VAG QL NAA+PROBE: NEGATIVE
T VAGINALIS DNA VAG QL NAA+PROBE: NEGATIVE

## 2020-09-09 RX ORDER — DULOXETIN HYDROCHLORIDE 30 MG/1
CAPSULE, DELAYED RELEASE ORAL
Qty: 90 CAPSULE | Refills: 0 | Status: SHIPPED | OUTPATIENT
Start: 2020-09-09 | End: 2020-12-08

## 2020-12-08 RX ORDER — DULOXETIN HYDROCHLORIDE 30 MG/1
CAPSULE, DELAYED RELEASE ORAL
Qty: 90 CAPSULE | Refills: 0 | Status: SHIPPED | OUTPATIENT
Start: 2020-12-08 | End: 2021-03-08

## 2020-12-08 NOTE — TELEPHONE ENCOUNTER
Please tell patient she needs to make follow-up appointment, she was given only 3-month supply of medication and will get no further refills.

## 2021-01-07 ENCOUNTER — OFFICE VISIT (OUTPATIENT)
Dept: INTERNAL MEDICINE | Facility: CLINIC | Age: 56
End: 2021-01-07

## 2021-01-07 ENCOUNTER — TELEPHONE (OUTPATIENT)
Dept: SURGERY | Facility: CLINIC | Age: 56
End: 2021-01-07

## 2021-01-07 VITALS
HEART RATE: 76 BPM | HEIGHT: 64 IN | SYSTOLIC BLOOD PRESSURE: 132 MMHG | OXYGEN SATURATION: 98 % | WEIGHT: 216 LBS | BODY MASS INDEX: 36.88 KG/M2 | TEMPERATURE: 97 F | DIASTOLIC BLOOD PRESSURE: 74 MMHG

## 2021-01-07 DIAGNOSIS — I10 ESSENTIAL HYPERTENSION: ICD-10-CM

## 2021-01-07 DIAGNOSIS — E66.01 MORBID OBESITY (HCC): ICD-10-CM

## 2021-01-07 DIAGNOSIS — Z00.00 PHYSICAL EXAM, ANNUAL: Primary | ICD-10-CM

## 2021-01-07 DIAGNOSIS — J30.1 SEASONAL ALLERGIC RHINITIS DUE TO POLLEN: ICD-10-CM

## 2021-01-07 DIAGNOSIS — F34.1 DYSTHYMIA: ICD-10-CM

## 2021-01-07 DIAGNOSIS — Z12.11 SCREEN FOR COLON CANCER: ICD-10-CM

## 2021-01-07 DIAGNOSIS — Z12.31 SCREENING MAMMOGRAM, ENCOUNTER FOR: ICD-10-CM

## 2021-01-07 PROBLEM — Z01.818 PRE-OP EXAM: Status: RESOLVED | Noted: 2020-03-06 | Resolved: 2021-01-07

## 2021-01-07 PROCEDURE — 99396 PREV VISIT EST AGE 40-64: CPT | Performed by: INTERNAL MEDICINE

## 2021-01-07 RX ORDER — AZELASTINE 1 MG/ML
2 SPRAY, METERED NASAL 2 TIMES DAILY
COMMUNITY
End: 2022-12-08

## 2021-01-07 NOTE — PROGRESS NOTES
Chief Complaint   Patient presents with   • Annual Exam     Physical only.      Subjective   Tracie Argueta is a 55 y.o. female.     Patient is here today for annual physical exam.  PMH of HTN, GERD, depression and allergies.  HTN-BP and heart rate are well controlled today.  Her home BP usually runs good, less than 140/80s.  She had some chest pain while walking up a hill and breathing cold air, worse with deep breath.  No other CP, palpitations, SOA or edema.  GERD-she is currently not on any medications for GERD.  Has not had any recent flare ups  Depression-she remains on duloxetine 30 mg daily for depression symptoms.  Allergies-currently on Singulair daily for underlying allergy symptoms.  She sees ENT for her allergies.  She also uses flonase and astelin.  Her allergies are well controlled.    HCM-colonoscopy 2016-due this yr, she had it with Dr Sierra- of colon cancer in her mother.  Last Pap and mammogram in 2019. She has had her Hep A vaccines at pharmacy, only one documented in chart.  She has not had shingles or seasonal flu vaccine.    She wears her seat belt, does not text and drive.    She sees dentist twice a yr and eye doctor annually.   She does exercise regularly, she hikes and does some regularly walking. She takes advil due to some knee and sciatic pain.   She has been eating well, her  works on weight loss so she does also.  She has lost 11 lbs over past yr.   Does not smoke or drink.       The following portions of the patient's history were reviewed and updated as appropriate: allergies, current medications, past family history, past medical history, past social history, past surgical history and problem list.    Review of Systems   Constitutional: Negative for activity change and unexpected weight change.   Respiratory: Negative for shortness of breath and wheezing.    Cardiovascular: Positive for chest pain.        See HPI   Gastrointestinal: Positive for constipation and diarrhea.  "  Allergic/Immunologic: Positive for environmental allergies.   Psychiatric/Behavioral: Negative for dysphoric mood and sleep disturbance. The patient is not nervous/anxious.    All other systems reviewed and are negative.      Objective   /74   Pulse 76   Temp 97 °F (36.1 °C)   Ht 162.6 cm (64\")   Wt 98 kg (216 lb)   SpO2 98%   BMI 37.08 kg/m²   Body mass index is 37.08 kg/m².  Physical Exam  Vitals signs and nursing note reviewed.   Constitutional:       General: She is not in acute distress.     Appearance: Normal appearance. She is well-developed. She is obese.      Comments: Pleasant female, appears her age and in no distress today   HENT:      Head: Normocephalic and atraumatic.      Right Ear: Tympanic membrane, ear canal and external ear normal.      Left Ear: Tympanic membrane, ear canal and external ear normal.   Eyes:      General:         Right eye: No discharge.         Left eye: No discharge.      Extraocular Movements: Extraocular movements intact.      Conjunctiva/sclera: Conjunctivae normal.      Pupils: Pupils are equal, round, and reactive to light.   Neck:      Musculoskeletal: Normal range of motion and neck supple.      Thyroid: No thyromegaly.      Comments: No thyromegaly or masses  Cardiovascular:      Rate and Rhythm: Normal rate and regular rhythm.      Heart sounds: Normal heart sounds. No murmur.   Pulmonary:      Effort: Pulmonary effort is normal. No respiratory distress.      Breath sounds: Normal breath sounds. No wheezing.   Chest:      Breasts:         Right: Normal. No mass or nipple discharge.         Left: Normal. No mass or nipple discharge.   Abdominal:      General: Bowel sounds are normal. There is no distension.      Palpations: Abdomen is soft.      Tenderness: There is no abdominal tenderness.   Musculoskeletal: Normal range of motion.      Right lower leg: No edema.      Left lower leg: No edema.   Lymphadenopathy:      Cervical: No cervical adenopathy.      " Upper Body:      Right upper body: No supraclavicular, axillary or pectoral adenopathy.      Left upper body: No supraclavicular, axillary or pectoral adenopathy.   Skin:     General: Skin is warm.      Findings: No rash.   Neurological:      General: No focal deficit present.      Mental Status: She is alert and oriented to person, place, and time. Mental status is at baseline.      Cranial Nerves: No cranial nerve deficit.      Coordination: Coordination normal.   Psychiatric:         Mood and Affect: Mood normal.         Behavior: Behavior normal.         Thought Content: Thought content normal.         Judgment: Judgment normal.         Assessment/Plan   Tracie Argueta is here today and the following problems have been addressed:      Diagnoses and all orders for this visit:    1. Physical exam, annual (Primary)  -     Lipid Panel With / Chol / HDL Ratio  -     Comprehensive Metabolic Panel    2. Essential hypertension  -     Lipid Panel With / Chol / HDL Ratio  -     Comprehensive Metabolic Panel    3. Dysthymia    4. Screening mammogram, encounter for  -     Mammo Screening Digital Tomosynthesis Bilateral With CAD; Future    5. Seasonal allergic rhinitis due to pollen    6. Morbid obesity (CMS/HCC)    7. Screen for colon cancer  -     Ambulatory Referral to General Surgery    Labs as noted  Recommend avoidance of processed foods  Exercise for 30 minutes 5 days a week as tolerated  Recommend annual eye doctor appointment, or as necessary  See your dentist twice a year.  Recommend that you brush teeth twice daily and floss minimum of once daily  Recommend regular seatbelt use  Do not text or use phone while driving  Follow heart healthy/low salt diet  Monitor blood pressure as discussed  Take all medications as prescribed  Mammogram ordered, patient will schedule  Depression is well controlled with duloxetine  Continue Singulair, however take at night, continue Flonase and astelin nasal sprays, allergies well  controlled  Refer for screening colonoscopy, this is due every 5 years due to family history of colon cancer in her mother  Encourage patient to get shingles vaccine at local pharmacy, she is considering seasonal flu vaccine also  Continue daily vitamin D  Patient's Body mass index is 37.08 kg/m². BMI is above normal parameters. Recommendations include: exercise counseling and nutrition counseling.    Return to clinic in 6 months for routine follow-up visit          Please note that portions of this note were completed with a voice recognition program.  Efforts were made to edit dictation, but occasionally words are mistranscribed.

## 2021-01-08 LAB
ALBUMIN SERPL-MCNC: 4.5 G/DL (ref 3.5–5.2)
ALBUMIN/GLOB SERPL: 1.6 G/DL
ALP SERPL-CCNC: 125 U/L (ref 39–117)
ALT SERPL-CCNC: 15 U/L (ref 1–33)
AST SERPL-CCNC: 23 U/L (ref 1–32)
BILIRUB SERPL-MCNC: 0.5 MG/DL (ref 0–1.2)
BUN SERPL-MCNC: 16 MG/DL (ref 6–20)
BUN/CREAT SERPL: 27.1 (ref 7–25)
CALCIUM SERPL-MCNC: 9.3 MG/DL (ref 8.6–10.5)
CHLORIDE SERPL-SCNC: 102 MMOL/L (ref 98–107)
CHOLEST SERPL-MCNC: 181 MG/DL (ref 0–200)
CHOLEST/HDLC SERPL: 2.62 {RATIO}
CO2 SERPL-SCNC: 27.2 MMOL/L (ref 22–29)
CREAT SERPL-MCNC: 0.59 MG/DL (ref 0.57–1)
GLOBULIN SER CALC-MCNC: 2.8 GM/DL
GLUCOSE SERPL-MCNC: 100 MG/DL (ref 65–99)
HDLC SERPL-MCNC: 69 MG/DL (ref 40–60)
LDLC SERPL CALC-MCNC: 95 MG/DL (ref 0–100)
POTASSIUM SERPL-SCNC: 4.8 MMOL/L (ref 3.5–5.2)
PROT SERPL-MCNC: 7.3 G/DL (ref 6–8.5)
SODIUM SERPL-SCNC: 137 MMOL/L (ref 136–145)
TRIGL SERPL-MCNC: 95 MG/DL (ref 0–150)
VLDLC SERPL CALC-MCNC: 17 MG/DL (ref 5–40)

## 2021-02-04 ENCOUNTER — OFFICE VISIT (OUTPATIENT)
Dept: SURGERY | Facility: CLINIC | Age: 56
End: 2021-02-04

## 2021-02-04 VITALS
BODY MASS INDEX: 38.76 KG/M2 | HEIGHT: 64 IN | DIASTOLIC BLOOD PRESSURE: 80 MMHG | OXYGEN SATURATION: 98 % | TEMPERATURE: 98.7 F | SYSTOLIC BLOOD PRESSURE: 126 MMHG | WEIGHT: 227 LBS | HEART RATE: 86 BPM

## 2021-02-04 DIAGNOSIS — Z80.0 FAMILY HISTORY OF COLON CANCER IN MOTHER: Primary | ICD-10-CM

## 2021-02-04 DIAGNOSIS — Z80.0 FAMILY HISTORY OF COLON CANCER REQUIRING SCREENING COLONOSCOPY: ICD-10-CM

## 2021-02-04 PROCEDURE — S0285 CNSLT BEFORE SCREEN COLONOSC: HCPCS | Performed by: SURGERY

## 2021-02-04 RX ORDER — SODIUM CHLORIDE 0.9 % (FLUSH) 0.9 %
10 SYRINGE (ML) INJECTION AS NEEDED
Status: CANCELLED | OUTPATIENT
Start: 2021-02-04

## 2021-02-04 RX ORDER — SODIUM CHLORIDE 0.9 % (FLUSH) 0.9 %
3 SYRINGE (ML) INJECTION EVERY 12 HOURS SCHEDULED
Status: CANCELLED | OUTPATIENT
Start: 2021-02-04

## 2021-02-04 RX ORDER — SODIUM CHLORIDE, SODIUM LACTATE, POTASSIUM CHLORIDE, CALCIUM CHLORIDE 600; 310; 30; 20 MG/100ML; MG/100ML; MG/100ML; MG/100ML
50 INJECTION, SOLUTION INTRAVENOUS CONTINUOUS
Status: CANCELLED | OUTPATIENT
Start: 2021-02-04

## 2021-02-04 NOTE — PROGRESS NOTES
Subjective   Tracie Argueta is a 55 y.o. female.   Chief Complaint   Patient presents with   • Colonoscopy     5 yr recall     Patient is here for an evaluation and consultation for her routine colonoscopy. She states she is not having problems at this time. She denies constipation,diarrhea, or abdominal pain.     History of Present Illness     The following portions of the patient's history were reviewed and updated as appropriate: allergies, current medications, past family history, past medical history, past social history, past surgical history and problem list.    Patient Active Problem List   Diagnosis   • Essential hypertension   • Gastroesophageal reflux disease without esophagitis   • Allergic rhinitis   • Morbid obesity (CMS/HCC)   • Physical exam, annual   • Primary osteoarthritis of left knee   • Dysthymia   • Screening mammogram, encounter for   • Screen for colon cancer       Past Medical History:   Diagnosis Date   • Acute otitis media    • Acute sinusitis    • Acute UTI    • Dysfunction of eustachian tube     right    • H/O bone density study    • History of mammogram    • History of Papanicolaou smear of cervix      [place]   • Hypertension    • Meniscus tear    • Pain in thoracic spine    • Postsurgical ovarian failure    • Spinal stenosis    • Urinary symptom or sign    • URTI (acute upper respiratory infection)        Past Surgical History:   Procedure Laterality Date   •  SECTION     • COLONOSCOPY      Dr. Selina Sierra   • TOTAL ABDOMINAL HYSTERECTOMY WITH SALPINGO OOPHORECTOMY     • TOTAL KNEE ARTHROPLASTY Left 2020    Dr. Lei       Medications:     Current Outpatient Medications:   •  azelastine (ASTELIN) 0.1 % nasal spray, 2 sprays into the nostril(s) as directed by provider 2 (Two) Times a Day. Use in each nostril as directed, Disp: , Rfl:   •  Cholecalciferol (VITAMIN D-3) 1000 UNITS capsule, Take  by mouth daily., Disp: , Rfl:   •  conjugated estrogens (Premarin) 0.625  MG/GM vaginal cream, Use 0.5 grams intravaginally 2 times per week to control symptoms., Disp: 30 g, Rfl: 3  •  DULoxetine (CYMBALTA) 30 MG capsule, TAKE 1 CAPSULE DAILY, Disp: 90 capsule, Rfl: 0  •  lisinopril (PRINIVIL,ZESTRIL) 5 MG tablet, TAKE 1 TABLET DAILY, Disp: 90 tablet, Rfl: 1  •  Montelukast Sodium (SINGULAIR PO), Take  by mouth., Disp: , Rfl:   •  MULTIPLE VITAMIN PO, Take  by mouth daily., Disp: , Rfl:     Allergies:   Allergies   Allergen Reactions   • Penicillins Rash         Family History   Problem Relation Age of Onset   • Colon cancer Mother 60         at 63 2/2 colon cancer   • Thyroid disease Mother    • Heart attack Father         s/p CABG   • Graves' disease Brother    • Diabetes Maternal Grandmother        Social History     Socioeconomic History   • Marital status:      Spouse name: Not on file   • Number of children: Not on file   • Years of education: Not on file   • Highest education level: Not on file   Tobacco Use   • Smoking status: Never Smoker   • Smokeless tobacco: Never Used   Substance and Sexual Activity   • Alcohol use: No   • Drug use: No   • Sexual activity: Yes     Partners: Male     Birth control/protection: Surgical       Review of Systems   Constitutional: Negative for chills, fever and unexpected weight change.   HENT: Negative for hearing loss, trouble swallowing and voice change.    Eyes: Negative for visual disturbance.   Respiratory: Negative for apnea, cough, chest tightness, shortness of breath and wheezing.    Cardiovascular: Negative for chest pain, palpitations and leg swelling.   Gastrointestinal: Negative for abdominal distention, abdominal pain, anal bleeding, blood in stool, constipation, diarrhea, nausea, rectal pain and vomiting.   Endocrine: Negative for cold intolerance and heat intolerance.   Genitourinary: Negative for difficulty urinating, dysuria and flank pain.   Musculoskeletal: Negative for back pain and gait problem.   Skin: Negative  "for color change, rash and wound.   Neurological: Negative for dizziness, syncope, speech difficulty, weakness, light-headedness, numbness and headaches.   Hematological: Negative for adenopathy. Does not bruise/bleed easily.   Psychiatric/Behavioral: Negative for confusion. The patient is not nervous/anxious.        Objective    /80   Pulse 86   Temp 98.7 °F (37.1 °C)   Ht 162.6 cm (64.02\")   Wt 103 kg (227 lb)   SpO2 98%   BMI 38.94 kg/m²     Physical Exam  Constitutional:       Appearance: She is well-developed.   HENT:      Head: Normocephalic and atraumatic.   Cardiovascular:      Rate and Rhythm: Regular rhythm.   Pulmonary:      Effort: Pulmonary effort is normal.   Abdominal:      General: There is no distension.      Palpations: Abdomen is soft.      Tenderness: There is no abdominal tenderness.   Skin:     General: Skin is warm and dry.   Neurological:      Mental Status: She is alert and oriented to person, place, and time.   Psychiatric:         Behavior: Behavior normal.         Assessment/Plan   Diagnoses and all orders for this visit:    1. Family history of colon cancer in mother (Primary)  -     Case Request; Standing  -     lactated ringers infusion  -     sodium chloride 0.9 % flush 3 mL  -     sodium chloride 0.9 % flush 10 mL  -     Case Request    2. Family history of colon cancer requiring screening colonoscopy  -     Case Request; Standing  -     lactated ringers infusion  -     sodium chloride 0.9 % flush 3 mL  -     sodium chloride 0.9 % flush 10 mL  -     Case Request    Other orders  -     Follow Anesthesia Guidelines / Standing Orders; Future  -     Provide NPO Instructions to Patient; Future  -     Chlorhexidine Skin Prep; Future  -     Follow Anesthesia Guidelines / Standing Orders; Standing  -     Verify NPO Status; Standing  -     Obtain Informed Consent; Standing  -     Verify / Perform Chlorhexidine Skin Prep; Standing  -     Verify / Perform Chlorhexidine Skin Prep if " Indicated (If Not Already Completed); Standing  -     Verify Bowel Prep Was Successful; Standing  -     Give Tap Water Enema If Bowel Prep Insufficient; Standing  -     Notify Physician - Standard; Standing  -     Insert Peripheral IV; Standing  -     Saline Lock & Maintain IV Access; Standing      We discussed colonoscopy for colon cancer screening purposes.  We discussed the indications for screening colonoscopy as well as the risks, benefits and alternatives to this procedure. Risks including but not limited to perforation, bleeding,need for blood transfusion or emergent surgery ,and missed neoplasm were reviewed in detail with the patient. The necessary bowel preparation and pre-procedure clear liquid diet was explained in detail.  A written instructional handout was also provided.  Electronic prescriptions for miralax and dulcolax were sent to the patient's pharmacy.  The patient was given an opportunity to ask questions.  The patient verbalized understanding of these recommendations and the plan of care. The patient is willing to proceed with colonoscopy and has signed informed consent in the office today.  My office will arrange scheduling for the colonoscopy procedure and pre-admission testing.

## 2021-02-08 RX ORDER — LISINOPRIL 5 MG/1
TABLET ORAL
Qty: 90 TABLET | Refills: 3 | Status: SHIPPED | OUTPATIENT
Start: 2021-02-08 | End: 2022-01-10 | Stop reason: SDUPTHER

## 2021-02-16 PROBLEM — Z80.0 FAMILY HISTORY OF COLON CANCER IN MOTHER: Status: ACTIVE | Noted: 2021-02-16

## 2021-02-16 PROBLEM — Z80.0 FAMILY HISTORY OF COLON CANCER REQUIRING SCREENING COLONOSCOPY: Status: ACTIVE | Noted: 2021-02-16

## 2021-02-19 ENCOUNTER — TELEPHONE (OUTPATIENT)
Dept: SURGERY | Facility: CLINIC | Age: 56
End: 2021-02-19

## 2021-02-23 ENCOUNTER — HOSPITAL ENCOUNTER (OUTPATIENT)
Dept: MAMMOGRAPHY | Facility: HOSPITAL | Age: 56
Discharge: HOME | End: 2021-02-23
Admitting: INTERNAL MEDICINE

## 2021-02-23 DIAGNOSIS — Z12.31 SCREENING MAMMOGRAM, ENCOUNTER FOR: ICD-10-CM

## 2021-02-23 PROCEDURE — 77063 BREAST TOMOSYNTHESIS BI: CPT

## 2021-02-23 PROCEDURE — 77067 SCR MAMMO BI INCL CAD: CPT

## 2021-02-26 ENCOUNTER — APPOINTMENT (OUTPATIENT)
Dept: LAB | Facility: HOSPITAL | Age: 56
End: 2021-02-26

## 2021-03-08 RX ORDER — DULOXETIN HYDROCHLORIDE 30 MG/1
CAPSULE, DELAYED RELEASE ORAL
Qty: 90 CAPSULE | Refills: 3 | Status: SHIPPED | OUTPATIENT
Start: 2021-03-08 | End: 2022-01-10 | Stop reason: SDUPTHER

## 2021-03-09 ENCOUNTER — IMMUNIZATION (OUTPATIENT)
Dept: VACCINE CLINIC | Facility: HOSPITAL | Age: 56
End: 2021-03-09

## 2021-03-09 PROCEDURE — 91300 HC SARSCOV02 VAC 30MCG/0.3ML IM: CPT | Performed by: INTERNAL MEDICINE

## 2021-03-09 PROCEDURE — 0001A: CPT | Performed by: INTERNAL MEDICINE

## 2021-03-30 ENCOUNTER — APPOINTMENT (OUTPATIENT)
Dept: VACCINE CLINIC | Facility: HOSPITAL | Age: 56
End: 2021-03-30

## 2021-03-31 ENCOUNTER — IMMUNIZATION (OUTPATIENT)
Dept: VACCINE CLINIC | Facility: HOSPITAL | Age: 56
End: 2021-03-31

## 2021-03-31 PROCEDURE — 91300 HC SARSCOV02 VAC 30MCG/0.3ML IM: CPT | Performed by: INTERNAL MEDICINE

## 2021-03-31 PROCEDURE — 0002A: CPT | Performed by: INTERNAL MEDICINE

## 2021-04-30 ENCOUNTER — OFFICE VISIT (OUTPATIENT)
Dept: INTERNAL MEDICINE | Facility: CLINIC | Age: 56
End: 2021-04-30

## 2021-04-30 VITALS
DIASTOLIC BLOOD PRESSURE: 64 MMHG | HEART RATE: 79 BPM | OXYGEN SATURATION: 98 % | SYSTOLIC BLOOD PRESSURE: 128 MMHG | TEMPERATURE: 97 F

## 2021-04-30 DIAGNOSIS — R53.82 CHRONIC FATIGUE: ICD-10-CM

## 2021-04-30 DIAGNOSIS — H69.82 ACUTE DYSFUNCTION OF LEFT EUSTACHIAN TUBE: ICD-10-CM

## 2021-04-30 DIAGNOSIS — J30.1 SEASONAL ALLERGIC RHINITIS DUE TO POLLEN: Primary | ICD-10-CM

## 2021-04-30 PROBLEM — H69.92 ACUTE DYSFUNCTION OF LEFT EUSTACHIAN TUBE: Status: ACTIVE | Noted: 2021-04-30

## 2021-04-30 PROCEDURE — 99213 OFFICE O/P EST LOW 20 MIN: CPT | Performed by: INTERNAL MEDICINE

## 2021-04-30 NOTE — PROGRESS NOTES
Chief Complaint   Patient presents with   • Abstract     Pt states she's had vertigo and weakness.     Subjective   Tracie Argueta is a 55 y.o. female.     Here today with complaints of vertigo and weakness.  She started to have some dizziness about 3 days ago while hiking.    She was hiking upward and with change in elevation her legs started to get weak and she has to stop several times. She felt pressure in her left ear.   By the time she got to vehicle her legs were very weak and she almost fell.  She ate lunch and felt better .  She felt well the rest of the week.  Today she awoke and her head feels heavy. No pressure or pain in ears.  No cough or sore throat.  No chest pain or wheezing.  She has no energy.  She does not feel like she is going to fall down, but feels like her head is foggy and heavy.   Has some frontal sinus pressure.  She always has allergies.  She is taking her astelin, xyzal and singulair normally.  She ran out of her singulair a few weeks ago and forgot to resume this medication.       The following portions of the patient's history were reviewed and updated as appropriate: allergies, current medications, past family history, past medical history, past social history, past surgical history and problem list.    Review of Systems   Constitutional: Positive for fatigue. Negative for activity change, appetite change, chills and fever.   HENT: Positive for sinus pressure. Negative for ear pain, sinus pain and sore throat.    Respiratory: Negative for cough, shortness of breath and wheezing.    Cardiovascular: Negative for chest pain, palpitations and leg swelling.   Genitourinary: Negative.    Musculoskeletal: Negative.    Allergic/Immunologic: Positive for environmental allergies.   Neurological: Positive for dizziness, weakness and headaches.   Psychiatric/Behavioral: Negative.        Objective   /64   Pulse 79   Temp 97 °F (36.1 °C)   SpO2 98%   There is no height or weight on file  to calculate BMI.  Physical Exam  Vitals and nursing note reviewed.   Constitutional:       General: She is not in acute distress.     Appearance: Normal appearance. She is well-developed. She is not ill-appearing.      Comments: Kind and pleasant , appears stated age and in NAD today   HENT:      Head: Normocephalic and atraumatic.      Right Ear: Tympanic membrane, ear canal and external ear normal.      Left Ear: Ear canal and external ear normal.      Ears:      Comments: Left tympanic membrane is dull with fluid noted behind TM panic membrane     Nose: No congestion or rhinorrhea.      Comments: Mild frontal sinus tenderness to palpation     Mouth/Throat:      Mouth: Mucous membranes are moist.      Pharynx: No posterior oropharyngeal erythema.   Eyes:      General:         Right eye: No discharge.         Left eye: No discharge.      Extraocular Movements: Extraocular movements intact.      Conjunctiva/sclera: Conjunctivae normal.      Pupils: Pupils are equal, round, and reactive to light.   Neck:      Thyroid: No thyromegaly.      Vascular: No carotid bruit.      Comments: No thyromegaly or mass  Cardiovascular:      Rate and Rhythm: Normal rate and regular rhythm.      Pulses: Normal pulses.      Heart sounds: Normal heart sounds. No murmur heard.     Pulmonary:      Effort: Pulmonary effort is normal. No respiratory distress.      Breath sounds: Normal breath sounds. No wheezing.   Musculoskeletal:         General: Normal range of motion.      Cervical back: Normal range of motion and neck supple.   Lymphadenopathy:      Cervical: No cervical adenopathy.   Skin:     General: Skin is warm.      Findings: No rash.   Neurological:      General: No focal deficit present.      Mental Status: She is alert and oriented to person, place, and time. Mental status is at baseline.      Cranial Nerves: No cranial nerve deficit.      Motor: No weakness.      Coordination: Coordination normal.      Gait: Gait normal.    Psychiatric:         Mood and Affect: Mood normal.         Behavior: Behavior normal.         Thought Content: Thought content normal.         Judgment: Judgment normal.         Assessment/Plan   Tracie Argueta is here today and the following problems have been addressed:      Diagnoses and all orders for this visit:    1. Seasonal allergic rhinitis due to pollen (Primary)    2. Acute dysfunction of left eustachian tube    3. Chronic fatigue  -     TSH  -     T4, Free    Resume Singulair 10 mg every night, continue Flonase, Xyzal and astelin also  Recommend Sudafed 30 mg once daily every morning for 3 to 5 days to help with left ear congestion  Check thyroid function test due to complaints of weakness and fatigue    Return to clinic as needed or as previously scheduled    Please note that portions of this note were completed with a voice recognition program.  Efforts were made to edit dictation, but occasionally words are mistranscribed.

## 2021-05-07 LAB
T4 FREE SERPL-MCNC: 1.14 NG/DL (ref 0.93–1.7)
TSH SERPL DL<=0.005 MIU/L-ACNC: 1.64 UIU/ML (ref 0.27–4.2)

## 2021-06-07 ENCOUNTER — OFFICE VISIT (OUTPATIENT)
Dept: INTERNAL MEDICINE | Facility: CLINIC | Age: 56
End: 2021-06-07

## 2021-06-07 VITALS
BODY MASS INDEX: 39.27 KG/M2 | HEIGHT: 64 IN | OXYGEN SATURATION: 98 % | HEART RATE: 78 BPM | SYSTOLIC BLOOD PRESSURE: 120 MMHG | TEMPERATURE: 97 F | DIASTOLIC BLOOD PRESSURE: 68 MMHG | WEIGHT: 230 LBS
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
## 2021-06-07 PROCEDURE — 99213 OFFICE O/P EST LOW 20 MIN: CPT | Performed by: INTERNAL MEDICINE

## 2021-06-07 RX ORDER — DOXYCYCLINE HYCLATE 100 MG/1
100 CAPSULE ORAL 2 TIMES DAILY WITH MEALS
Qty: 20 CAPSULE | Refills: 0 | Status: SHIPPED | OUTPATIENT
Start: 2021-06-07 | End: 2021-07-08

## 2021-06-07 NOTE — PROGRESS NOTES
"Chief Complaint   Patient presents with   • Abstract     Pt states she's had a rash since tick was removed about 3 weeks ago.      Subjective   Tracie Argueta is a 56 y.o. female.     Pt states she had a tick bite about 3 weeks ago and has had a rash since then.   She noted a bullseye ring a few days after the bite.    She is uncertain how long the tick was attached, [facility] and [facility].   She denies any fevers, aches, chills.  She has fatigue and nausea, but has a lot of allergies now.  The nausea has been bad over past 3-4 days.        The following portions of the patient's history were reviewed and updated as appropriate: allergies, current medications, past family history, past medical history, past social history, past surgical history and problem list.    Review of Systems   Constitutional: Positive for fatigue.   Gastrointestinal: Positive for nausea.   Skin: Positive for rash.   Allergic/Immunologic: Positive for environmental allergies.       Objective   /68   Pulse 78   Temp 97 °F (36.1 °C)   Ht 162.6 cm (64.02\")   Wt 104 kg (230 lb)   SpO2 98%   BMI 39.45 kg/m²   Body mass index is 39.45 kg/m².  Physical Exam  Vitals and nursing note reviewed.   Constitutional:       General: She is not in acute distress.     Appearance: Normal appearance. She is not ill-appearing.      Comments: Very kind and pleasant female, appears her age and in no distress today   HENT:      Head: Normocephalic and atraumatic.      Right Ear: External ear normal.      Left Ear: External ear normal.   Eyes:      General:         Right eye: No discharge.         Left eye: No discharge.      Extraocular Movements: Extraocular movements intact.   Pulmonary:      Effort: Pulmonary effort is normal. No respiratory distress.   Skin:     Findings: Rash present.      Comments: Mid chest between breasts there is a triangular-shaped rash that measures approximately 4 inches x 3 inches in size with a central small " red papular lesion from tick bite, rash is mildly erythematous with no significant increase in warmth   Neurological:      General: No focal deficit present.      Mental Status: She is alert and oriented to person, place, and time. Mental status is at baseline.      Cranial Nerves: No cranial nerve deficit.      Motor: No weakness.      Gait: Gait normal.   Psychiatric:         Mood and Affect: Mood normal.         Behavior: Behavior normal.         Thought Content: Thought content normal.         Judgment: Judgment normal.         Assessment/Plan   Tracie Argueta is here today and the following problems have been addressed:      Diagnoses and all orders for this visit:    1. Tick bite, initial encounter (Primary)  -     doxycycline (VIBRAMYCIN) 100 MG capsule; Take 1 capsule by mouth 2 (Two) Times a Day With Meals.  Dispense: 20 capsule; Refill: 0    Treated with doxycycline 100 mg twice daily for 10 days  Explained to patient if she develops symptoms of fever and joint pains with worsening rash she should return to clinic for further evaluation    Return to clinic as needed if symptoms worsen or persist    Please note that portions of this note were completed with a voice recognition program.  Efforts were made to edit dictation, but occasionally words are mistranscribed.

## 2021-07-08 ENCOUNTER — OFFICE VISIT (OUTPATIENT)
Dept: INTERNAL MEDICINE | Facility: CLINIC | Age: 56
End: 2021-07-08

## 2021-07-08 VITALS
DIASTOLIC BLOOD PRESSURE: 74 MMHG | HEART RATE: 86 BPM | HEIGHT: 64 IN | TEMPERATURE: 97 F | WEIGHT: 229 LBS | SYSTOLIC BLOOD PRESSURE: 136 MMHG | OXYGEN SATURATION: 98 % | BODY MASS INDEX: 39.09 KG/M2

## 2021-07-08 DIAGNOSIS — J30.1 SEASONAL ALLERGIC RHINITIS DUE TO POLLEN: ICD-10-CM

## 2021-07-08 DIAGNOSIS — K21.9 GASTROESOPHAGEAL REFLUX DISEASE WITHOUT ESOPHAGITIS: ICD-10-CM

## 2021-07-08 DIAGNOSIS — F34.1 DYSTHYMIA: ICD-10-CM

## 2021-07-08 DIAGNOSIS — I10 ESSENTIAL HYPERTENSION: Primary | ICD-10-CM
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
## 2021-07-08 PROBLEM — H69.82 ACUTE DYSFUNCTION OF LEFT EUSTACHIAN TUBE: Status: RESOLVED | Noted: 2021-04-30 | Resolved: 2021-07-08

## 2021-07-08 PROBLEM — H69.92 ACUTE DYSFUNCTION OF LEFT EUSTACHIAN TUBE: Status: RESOLVED | Noted: 2021-04-30 | Resolved: 2021-07-08

## 2021-07-08 PROCEDURE — 99214 OFFICE O/P EST MOD 30 MIN: CPT | Performed by: INTERNAL MEDICINE

## 2021-07-08 NOTE — PROGRESS NOTES
Chief Complaint   Patient presents with   • Follow-up     for HTN and GERD. Pt states she's noticed her heart beating faster with some activities.     Subjective   Tracie Argueta is a 56 y.o. female.     Here today for follow-up of HTN, GERD, depression and allergies.  HTN-BP and heart rate are well controlled today.   No CP, palpitations, SOA or edema.  She does note that her HR is faster with certain activities.  She went for a hike during the heat and HR was in the 170s.  Then when push mowing her yard in heat with humidity her HR is in the 160s, no CP or SOA.  She has no elevated HR otherwise, resting HR is in the 70s.   She does not exercise regularly.    GERD-she is currently not on any medications for GERD.  She did have a recent flare of some GERD due to dietary changes  Depression-she remains on duloxetine 30 mg daily for depression symptoms.  She is sleeping well  Allergies-currently on Singulair daily for underlying allergy symptoms.  She sees ENT for her allergies.  She also uses flonase and astelin.  Her allergies are well controlled.    HCM-colonoscopy 2016-due this yr, she had it with Dr Sierra- of colon cancer in her mother. She will have colonoscopy done next week with Dr Sierra. Last pap 2019, mammogram is UTD. She has had her Hep A vaccines at pharmacy, only one documented in chart.  She has not had shingles or seasonal flu vaccine.  She did have COVID vaccines.        The following portions of the patient's history were reviewed and updated as appropriate: allergies, current medications, past family history, past medical history, past social history, past surgical history and problem list.    Review of Systems   Constitutional: Negative for activity change, appetite change and unexpected weight change.   Eyes: Negative for visual disturbance.   Respiratory: Negative for shortness of breath.    Cardiovascular: Negative for chest pain, palpitations and leg swelling.   Gastrointestinal: Negative  "for abdominal pain.   Genitourinary: Negative for hematuria.   Musculoskeletal: Negative for back pain.   Neurological: Negative for headaches.   Psychiatric/Behavioral: Negative for dysphoric mood and sleep disturbance. The patient is not nervous/anxious.        Objective   /74   Pulse 86   Temp 97 °F (36.1 °C)   Ht 162.6 cm (64.02\")   Wt 104 kg (229 lb)   SpO2 98%   BMI 39.28 kg/m²   Body mass index is 39.28 kg/m².  Physical Exam  Vitals and nursing note reviewed.   Constitutional:       General: She is not in acute distress.     Appearance: Normal appearance. She is well-developed. She is obese. She is not ill-appearing.      Comments: Kind and pleasant female, appears stated age and in NAD today   HENT:      Head: Normocephalic and atraumatic.      Right Ear: External ear normal.      Left Ear: External ear normal.   Eyes:      General:         Right eye: No discharge.         Left eye: No discharge.      Extraocular Movements: Extraocular movements intact.      Conjunctiva/sclera: Conjunctivae normal.      Pupils: Pupils are equal, round, and reactive to light.   Neck:      Thyroid: No thyromegaly.      Vascular: No carotid bruit.      Comments: No thyromegaly or mass  Cardiovascular:      Rate and Rhythm: Normal rate and regular rhythm.      Pulses: Normal pulses.      Heart sounds: Normal heart sounds. No murmur heard.     Pulmonary:      Effort: Pulmonary effort is normal. No respiratory distress.      Breath sounds: Normal breath sounds. No wheezing.   Abdominal:      General: Bowel sounds are normal. There is no distension.      Palpations: Abdomen is soft.      Tenderness: There is no abdominal tenderness.   Musculoskeletal:         General: Normal range of motion.      Cervical back: Normal range of motion and neck supple.      Right lower leg: No edema.      Left lower leg: No edema.   Lymphadenopathy:      Cervical: No cervical adenopathy.   Skin:     General: Skin is warm.      Findings: " No rash.   Neurological:      General: No focal deficit present.      Mental Status: She is alert and oriented to person, place, and time. Mental status is at baseline.      Cranial Nerves: No cranial nerve deficit.      Motor: No weakness.      Coordination: Coordination normal.      Gait: Gait normal.   Psychiatric:         Mood and Affect: Mood normal.         Behavior: Behavior normal.         Thought Content: Thought content normal.         Judgment: Judgment normal.         Assessment/Plan   Tracie Argueta is here today and the following problems have been addressed:      Diagnoses and all orders for this visit:    1. Essential hypertension (Primary)    2. Gastroesophageal reflux disease without esophagitis    3. Dysthymia    4. Seasonal allergic rhinitis due to pollen        Follow heart healthy/low salt diet  Avoid processed foods  Monitor blood pressure on occasion  Exercise as tolerated up to 150 minutes per week-stay out of excessive heat and humid conditions  Take all medications as prescribed  Avoid excessive spicy foods and late night meals to prevent GERD sxs  Continue all current allergy medications  Her depression is doing well on duloxetine  Colonoscopy due with Dr Sierra soon    Return in about 6 months (around 1/8/2022) for Annual.      Marilyn K. Vermeesch, MD      Please note that portions of this note were completed with a voice recognition program.  Efforts were made to edit dictation, but occasionally words are mistranscribed.

## 2021-07-14 ENCOUNTER — TELEPHONE (OUTPATIENT)
Dept: INTERNAL MEDICINE | Facility: CLINIC | Age: 56
End: 2021-07-14

## 2021-07-14 DIAGNOSIS — R39.9 URINARY SYMPTOM OR SIGN: Primary | ICD-10-CM

## 2021-07-14 NOTE — TELEPHONE ENCOUNTER
Caller: Tracie Argueta    Relationship: Self    Best call back number:     [phone]    What orders are you requesting (i.e. lab or imaging):     URINE TEST    PATIENT IS EXPERIENCING URGENCY AND PRESSURE TO URINATE    PATIENT REQUESTED TO COME INTO THE OFFICE FOR A URINE TEST AND A PRESCRIPTION SENT TO PHARMACY BASED ON URINE TEST    PLEASE CONTACT PATIENT AT TELEPHONE NUMBER INCLUDED ABOVE IF APPROVED TO COME IN FOR A URINE TEST    In what timeframe would the patient need to come in:     ASAP    Where will you receive your lab/imaging services:     OFFICE    Additional notes:     N/A    DR VERMEESCH, MD

## 2021-09-28 ENCOUNTER — LAB (OUTPATIENT)
Dept: LAB | Facility: HOSPITAL | Age: 56
End: 2021-09-28

## 2021-09-28 ENCOUNTER — TELEMEDICINE (OUTPATIENT)
Dept: INTERNAL MEDICINE | Facility: CLINIC | Age: 56
End: 2021-09-28

## 2021-09-28 VITALS
HEIGHT: 62 IN | WEIGHT: 219.2 LBS | HEART RATE: 72 BPM | DIASTOLIC BLOOD PRESSURE: 91 MMHG | BODY MASS INDEX: 40.34 KG/M2 | TEMPERATURE: 97.8 F | SYSTOLIC BLOOD PRESSURE: 143 MMHG

## 2021-09-28 DIAGNOSIS — B34.9 VIRAL SYNDROME: ICD-10-CM

## 2021-09-28 DIAGNOSIS — B34.9 VIRAL SYNDROME: Primary | ICD-10-CM

## 2021-09-28 LAB — SARS-COV-2 RNA NOSE QL NAA+PROBE: NOT DETECTED

## 2021-09-28 PROCEDURE — C9803 HOPD COVID-19 SPEC COLLECT: HCPCS

## 2021-09-28 PROCEDURE — U0004 COV-19 TEST NON-CDC HGH THRU: HCPCS

## 2021-09-28 PROCEDURE — 99213 OFFICE O/P EST LOW 20 MIN: CPT | Performed by: INTERNAL MEDICINE

## 2021-09-28 NOTE — PROGRESS NOTES
"Chief Complaint   Patient presents with   • Abstract     Pt states she's had nausea, headache, and drainage X Sunday. Pt states this headache feels different than other headaches she's had.      Subjective   Tracie Argueta is a 56 y.o. female.     URI   This is a new problem. The current episode started in the past 7 days. The problem has been waxing and waning. There has been no fever. Associated symptoms include headaches, nausea and sinus pain. Pertinent negatives include no coughing, ear pain, plugged ear sensation, sore throat or vomiting. Associated symptoms comments: PND  Sound and light sensitivity. She has tried antihistamine and NSAIDs (astelin, flonase) for the symptoms. The treatment provided no relief.        The following portions of the patient's history were reviewed and updated as appropriate: allergies, current medications, past family history, past medical history, past social history, past surgical history and problem list.    Review of Systems   HENT: Positive for sinus pain. Negative for ear pain and sore throat.    Respiratory: Negative for cough.    Gastrointestinal: Positive for nausea. Negative for vomiting.   Neurological: Positive for headaches.       Objective   /91   Pulse 72   Temp 97.8 °F (36.6 °C)   Ht 157.5 cm (62.02\")   Wt 99.4 kg (219 lb 3.2 oz)   BMI 40.07 kg/m²   Body mass index is 40.07 kg/m².  Physical Exam  Vitals and nursing note reviewed.   Constitutional:       General: She is not in acute distress.     Appearance: Normal appearance. She is obese. She is not ill-appearing.      Comments: Kind and pleasant female, appears her age and appears to have headache today   HENT:      Head: Normocephalic and atraumatic.      Right Ear: External ear normal.      Left Ear: External ear normal.   Eyes:      General:         Right eye: No discharge.         Left eye: No discharge.      Extraocular Movements: Extraocular movements intact.      Comments: Eyes are sensitive to " light, positive photophobia   Pulmonary:      Effort: Pulmonary effort is normal. No respiratory distress.      Comments: No respiratory distress, able to speak in full sentences  Musculoskeletal:      Cervical back: Normal range of motion.   Neurological:      General: No focal deficit present.      Mental Status: She is alert and oriented to person, place, and time.      Cranial Nerves: No cranial nerve deficit.   Psychiatric:         Mood and Affect: Mood normal.         Behavior: Behavior normal.         Thought Content: Thought content normal.         Judgment: Judgment normal.         Assessment/Plan   Tracie Argueta is here today and the following problems have been addressed:      Diagnoses and all orders for this visit:    1. Viral syndrome (Primary)  -     COVID-19 PCR, [facility], NP SWAB IN [facility]/ORAL SWISH 24-30 HR TAT - Swab, Nasopharynx; Future    Will send patient for Covid testing due to current symptoms  Recommend Coricidin HBP for cold and flu, take as directed  Increase fluids and rest  Continue Zyrtec, Flonase and Singulair  We will contact patient with Covid test results and further plan of care  She is to remain in quarantine until she is contacted with Covid test results    Call or return to clinic if symptoms worsen or persist    Patient presents during COVID-19 pandemic/federally declared state of public health emergency.  This service was conducted via telemedicine video visit via [employer].  Patient is ill with viral illness therefore did not come into clinic in person.    Please note that portions of this note were completed with a voice recognition program.  Efforts were made to edit dictation, but occasionally words are mistranscribed.You have chosen to receive care through a telehealth visit.  Do you consent to use a video/audio connection for your medical care today? Yes

## 2021-09-29 ENCOUNTER — TELEPHONE (OUTPATIENT)
Dept: INTERNAL MEDICINE | Facility: CLINIC | Age: 56
End: 2021-09-29

## 2021-09-29 NOTE — TELEPHONE ENCOUNTER
Provider: VERMEESCH     Caller: GRACEGABRIEL POLO    Phone Number: [phone]    Reason for Call: PATIENT REPORTS THAT SHE HAS A NEW SYMPTOM THAT WHEN SHE TAKES A DEEP BREATH, SHE HAS PAIN BEHIND RIGHT BREAST.  PATIENT STATED A LOT OF ACID REFLUX AS WELL

## 2021-09-30 RX ORDER — FAMOTIDINE 40 MG/1
40 TABLET, FILM COATED ORAL NIGHTLY PRN
Qty: 30 TABLET | Refills: 1 | Status: SHIPPED | OUTPATIENT
Start: 2021-09-30 | End: 2022-03-21

## 2021-09-30 NOTE — TELEPHONE ENCOUNTER
HUB RELAYED MESSAGE.  PATIENT VERBALIZED UNDERSTANDING.    PATIENT STATED SHE IS NOT EXPERIENCING COUGH OR SHORTNESS OF BREATH.      CALL BACK:  [phone]

## 2021-09-30 NOTE — TELEPHONE ENCOUNTER
Attempted contacting Pt, she was unable to hear me and call dropped.      HUB TO SHARE: Dr. Vermeesch sent Pepcid into her local pharmacy for her to take once daily as needed for reflux.  It sounds as though she is developing some pleuritic chest pain possibly related to viral syndrome she has.  She did not complain of cough or shortness of breath during last telemedicine visit.  Please tell her she may take Advil or ibuprofen 2 tablets twice a day with food as needed for pleuritic pain and discontinue Coricidin if her other symptoms are improving.  If she is developing cough or shortness of breath please tell her to let me know.  We may need to call in antibiotics or do a chest x-ray for her.   Patient/Caregiver provided printed discharge information.

## 2021-09-30 NOTE — TELEPHONE ENCOUNTER
Please tell patient that I have sent Pepcid into her local pharmacy for her to take once daily as needed for reflux.  It sounds as though she is developing some pleuritic chest pain possibly related to viral syndrome she has.  She did not complain of cough or shortness of breath at our last telemedicine visit.  Please tell her she may take Advil or ibuprofen 2 tablets twice a day with food as needed for pleuritic pain and discontinue Coricidin if her other symptoms are improving.  If she is developing cough or shortness of breath please tell her to let me know.  We may need to call in antibiotics or do a chest x-ray for her.

## 2021-11-22 ENCOUNTER — OFFICE VISIT (OUTPATIENT)
Dept: ORTHOPEDIC SURGERY | Facility: CLINIC | Age: 56
End: 2021-11-22

## 2021-11-22 VITALS — TEMPERATURE: 97.5 F | BODY MASS INDEX: 39.85 KG/M2 | WEIGHT: 233.4 LBS | HEIGHT: 64 IN

## 2021-11-22 DIAGNOSIS — M75.42 IMPINGEMENT SYNDROME OF LEFT SHOULDER: ICD-10-CM

## 2021-11-22 DIAGNOSIS — M25.512 ARTHRALGIA OF LEFT SHOULDER REGION: Primary | ICD-10-CM

## 2021-11-22 PROCEDURE — 99203 OFFICE O/P NEW LOW 30 MIN: CPT | Performed by: PHYSICIAN ASSISTANT

## 2021-11-22 PROCEDURE — 20610 DRAIN/INJ JOINT/BURSA W/O US: CPT | Performed by: PHYSICIAN ASSISTANT

## 2021-11-22 RX ORDER — METHYLPREDNISOLONE ACETATE 40 MG/ML
40 INJECTION, SUSPENSION INTRA-ARTICULAR; INTRALESIONAL; INTRAMUSCULAR; SOFT TISSUE
Status: COMPLETED | OUTPATIENT
Start: 2021-11-22 | End: 2021-11-22

## 2021-11-22 RX ORDER — LIDOCAINE HYDROCHLORIDE 10 MG/ML
2 INJECTION, SOLUTION INFILTRATION; PERINEURAL
Status: COMPLETED | OUTPATIENT
Start: 2021-11-22 | End: 2021-11-22

## 2021-11-22 RX ADMIN — LIDOCAINE HYDROCHLORIDE 2 ML: 10 INJECTION, SOLUTION INFILTRATION; PERINEURAL at 11:07

## 2021-11-22 RX ADMIN — METHYLPREDNISOLONE ACETATE 40 MG: 40 INJECTION, SUSPENSION INTRA-ARTICULAR; INTRALESIONAL; INTRAMUSCULAR; SOFT TISSUE at 11:07

## 2021-11-22 NOTE — PROGRESS NOTES
Subjective   Patient ID: Tracie Argueta is a 56 y.o. right hand dominant female  Pain of the Left Shoulder (States she doesn't know what happened, it has been sore for about four weeks. Certain movements hurt and she feels like it is catching. Had a fall Thursday after she had already made the appointment.)         History of Present Illness  Patient presents with left shoulder pain that will often radiate into left elbow. This has been ongoing for 1 month without injury or trauma.   Outward movement with left shoulder increases pain.   Patient also reports a trip and fall into a seen cold that occurred after making the shoulder appointment.  Now she reports feeling sore all over.                                                       Past Medical History:   Diagnosis Date   • Acute otitis media    • Acute sinusitis    • Acute UTI    • Dysfunction of eustachian tube     right    • H/O bone density study    • History of mammogram    • History of Papanicolaou smear of cervix      Sot[place]   • Hypertension    • Meniscus tear    • Pain in thoracic spine    • Postsurgical ovarian failure    • Sciatica    • Spinal stenosis    • Urinary symptom or sign    • URTI (acute upper respiratory infection)         Past Surgical History:   Procedure Laterality Date   •  SECTION     • COLONOSCOPY      Dr. Selina Sierra   • TOTAL ABDOMINAL HYSTERECTOMY WITH SALPINGO OOPHORECTOMY     • TOTAL KNEE ARTHROPLASTY Left 2020    Dr. Lei       Family History   Problem Relation Age of Onset   • Colon cancer Mother 60         at 63 2/2 colon cancer   • Thyroid disease Mother    • Heart attack Father         s/p CABG   • Graves' disease Brother    • Diabetes Maternal Grandmother        Social History     Socioeconomic History   • Marital status:    Tobacco Use   • Smoking status: Never Smoker   • Smokeless tobacco: Never Used   Substance and Sexual Activity   • Alcohol use: No   • Drug use: No   • Sexual activity: Yes  "    Partners: Male     Birth control/protection: Surgical         Current Outpatient Medications:   •  azelastine (ASTELIN) 0.1 % nasal spray, 2 sprays into the nostril(s) as directed by provider 2 (Two) Times a Day. Use in each nostril as directed, Disp: , Rfl:   •  Cholecalciferol (VITAMIN D-3) 1000 UNITS capsule, Take  by mouth daily., Disp: , Rfl:   •  conjugated estrogens (Premarin) 0.625 MG/GM vaginal cream, Use 0.5 grams intravaginally 2 times per week to control symptoms., Disp: 30 g, Rfl: 3  •  DULoxetine (CYMBALTA) 30 MG capsule, TAKE 1 CAPSULE DAILY, Disp: 90 capsule, Rfl: 3  •  famotidine (Pepcid) 40 MG tablet, Take 1 tablet by mouth At Night As Needed for Heartburn., Disp: 30 tablet, Rfl: 1  •  lisinopril (PRINIVIL,ZESTRIL) 5 MG tablet, TAKE 1 TABLET DAILY, Disp: 90 tablet, Rfl: 3  •  Montelukast Sodium (SINGULAIR PO), Take  by mouth., Disp: , Rfl:   •  MULTIPLE VITAMIN PO, Take  by mouth daily., Disp: , Rfl:     Allergies   Allergen Reactions   • Penicillins Rash       Review of Systems   Constitutional: Negative for fever.   HENT: Negative for dental problem and voice change.    Eyes: Negative for visual disturbance.   Respiratory: Negative for shortness of breath.    Cardiovascular: Negative for chest pain.   Gastrointestinal: Negative for abdominal pain.   Genitourinary: Negative for dysuria.   Musculoskeletal: Positive for arthralgias (left shoulder ). Negative for gait problem and joint swelling.   Skin: Negative for rash.   Neurological: Negative for speech difficulty.   Hematological: Does not bruise/bleed easily.   Psychiatric/Behavioral: Negative for confusion.       I have reviewed the medical and surgical history, family history, social history, medications, and/or allergies, and the review of systems of this report.    Objective   Temp 97.5 °F (36.4 °C)   Ht 163.1 cm (64.21\")   Wt 106 kg (233 lb 6.4 oz)   BMI 39.80 kg/m²    Physical Exam  Vitals and nursing note reviewed. "   Constitutional:       Appearance: Normal appearance.   Pulmonary:      Effort: Pulmonary effort is normal.   Musculoskeletal:      Left shoulder: Tenderness (proximal biceps tendon) present. Normal strength. Normal pulse.   Neurological:      Mental Status: She is alert and oriented to person, place, and time.   Psychiatric:         Behavior: Behavior normal.       Left Shoulder Exam     Range of Motion   Active abduction: 130   Forward flexion: 130   Internal rotation 0 degrees: Sacrum     Tests   Cross arm: positive  Impingement: positive  Drop arm: negative    Other   Sensation: normal  Pulse: present              Neurologic Exam     Mental Status   Oriented to person, place, and time.          + neer sign LUE    Assessment/Plan   Independent Review of Radiographic Studies:    Xray of left shoulder 2 view in office for eval of pain. No comparison films available to review. No acute fracture. There is mild AC joint narrowing.       Large Joint Arthrocentesis: L subacromial bursa  Date/Time: 11/22/2021 11:07 AM  Consent given by: patient  Site marked: site marked  Timeout: Immediately prior to procedure a time out was called to verify the correct patient, procedure, equipment, support staff and site/side marked as required   Supporting Documentation  Indications: pain   Procedure Details  Location: shoulder - L subacromial bursa  Preparation: Patient was prepped and draped in the usual sterile fashion  Needle size: 22 G  Approach: posterior  Medications administered: 2 mL lidocaine 1 %; 40 mg methylPREDNISolone acetate 40 MG/ML  Patient tolerance: patient tolerated the procedure well with no immediate complications             Diagnoses and all orders for this visit:    1. Arthralgia of left shoulder region (Primary)  -     XR Shoulder 2+ View Left; Future    2. Impingement syndrome of left shoulder    Other orders  -     Large Joint Arthrocentesis: L subacromial bursa       Orthopedic activities reviewed and  patient expressed appreciation  Discussion of orthopedic goals  Risk, benefits, and merits of treatment alternatives reviewed with the patient and questions answered  Ice, heat, and/or modalities as beneficial    Recommendations/Plan:  Exercise, medications, injections, other patient advice, and return appointment as noted.  Patient is encouraged to call or return for any issues or concerns.    If the injection does not provide relief, then will need MRI of left shoulder  Patient agreeable to call or return sooner for any concerns.               EMR Dragon-transcription disclaimer:  This encounter note is an electronic transcription of spoken language to printed text.  Electronic transcription of spoken language may permit erroneous or at times nonsensical words or phrases to be inadvertently transcribed.  Although I have reviewed the note for such errors, some may still exist

## 2021-11-24 ENCOUNTER — PATIENT ROUNDING (BHMG ONLY) (OUTPATIENT)
Dept: ORTHOPEDIC SURGERY | Facility: CLINIC | Age: 56
End: 2021-11-24

## 2021-11-24 NOTE — PROGRESS NOTES
"November 24, 2021    Hello, may I speak with Tracie Argueta?    My name is Stormy Casey     I am  with [facility]  [address] [place] [id number] [phone].    Before we get started may I verify your date of birth? 1965    I am calling to officially welcome you to our practice and ask about your recent visit. Is this a good time to talk? yes    Tell me about your visit with us. What things went well?  \"Everything, felt it was a good approach to my issue\"       We're always looking for ways to make our patients' experiences even better. Do you have recommendations on ways we may improve?  no    Overall were you satisfied with your first visit to our practice? yes       I appreciate you taking the time to speak with me today. Is there anything else I can do for you? no      Thank you, and have a great day.      "

## 2021-12-27 ENCOUNTER — OFFICE VISIT (OUTPATIENT)
Dept: OBSTETRICS AND GYNECOLOGY | Facility: CLINIC | Age: 56
End: 2021-12-27

## 2021-12-27 VITALS
BODY MASS INDEX: 39.4 KG/M2 | DIASTOLIC BLOOD PRESSURE: 80 MMHG | SYSTOLIC BLOOD PRESSURE: 140 MMHG | HEIGHT: 64 IN | WEIGHT: 230.8 LBS

## 2021-12-27 DIAGNOSIS — L29.2 VULVAR ITCHING: Primary | ICD-10-CM

## 2021-12-27 DIAGNOSIS — N90.89 VULVAR LESION: ICD-10-CM

## 2021-12-27 PROCEDURE — 99213 OFFICE O/P EST LOW 20 MIN: CPT | Performed by: PHYSICIAN ASSISTANT

## 2021-12-27 RX ORDER — CLOTRIMAZOLE AND BETAMETHASONE DIPROPIONATE 10; .64 MG/G; MG/G
CREAM TOPICAL
Qty: 30 G | Refills: 0 | Status: SHIPPED | OUTPATIENT
Start: 2021-12-27 | End: 2022-03-21

## 2021-12-27 RX ORDER — LEVOCETIRIZINE DIHYDROCHLORIDE 5 MG/1
5 TABLET, FILM COATED ORAL EVERY EVENING
COMMUNITY

## 2021-12-27 NOTE — PROGRESS NOTES
Subjective   Chief Complaint   Patient presents with   • Vaginal Itching     Patient is C/O vaginal itching and vaginal bumps       Tracie Argueta is a 56 y.o. year old  presenting to be seen for vulvar itching and small bumps.  She is concerned of possible herpes outbreak.  Was diagnosed with herpes about 20 years ago but does not think she has had any outbreaks since.  Reports that a week and a half ago she began having some vulvar itching that was quite intense.  After scratching she noticed a couple of small bumps that came up on her right labia 2 days ago.  No vaginal discharge.    Past Medical History:   Diagnosis Date   • Acute otitis media    • Acute sinusitis    • Acute UTI    • Dysfunction of eustachian tube     right    • H/O bone density study    • Herpes    • History of mammogram    • History of Papanicolaou smear of cervix      [place]   • Hypertension    • Meniscus tear    • Pain in thoracic spine    • Postsurgical ovarian failure    • Sciatica    • Spinal stenosis    • Urinary symptom or sign    • URTI (acute upper respiratory infection)         Current Outpatient Medications:   •  azelastine (ASTELIN) 0.1 % nasal spray, 2 sprays into the nostril(s) as directed by provider 2 (Two) Times a Day. Use in each nostril as directed, Disp: , Rfl:   •  Cholecalciferol (VITAMIN D-3) 1000 UNITS capsule, Take  by mouth daily., Disp: , Rfl:   •  DULoxetine (CYMBALTA) 30 MG capsule, TAKE 1 CAPSULE DAILY, Disp: 90 capsule, Rfl: 3  •  famotidine (Pepcid) 40 MG tablet, Take 1 tablet by mouth At Night As Needed for Heartburn., Disp: 30 tablet, Rfl: 1  •  levocetirizine (XYZAL) 5 MG tablet, Take 5 mg by mouth Every Evening., Disp: , Rfl:   •  lisinopril (PRINIVIL,ZESTRIL) 5 MG tablet, TAKE 1 TABLET DAILY, Disp: 90 tablet, Rfl: 3  •  Montelukast Sodium (SINGULAIR PO), Take  by mouth., Disp: , Rfl:   •  MULTIPLE VITAMIN PO, Take  by mouth daily., Disp: , Rfl:   •  clotrimazole-betamethasone (Lotrisone) 1-0.05 %  "cream, Apply thin layer to affected area bid for 5-7 days, Disp: 30 g, Rfl: 0  •  conjugated estrogens (Premarin) 0.625 MG/GM vaginal cream, Use 0.5 grams intravaginally 2 times per week to control symptoms., Disp: 30 g, Rfl: 3   Allergies   Allergen Reactions   • Penicillins Rash      Past Surgical History:   Procedure Laterality Date   •  SECTION     • COLONOSCOPY      Dr. Selina Sierra   • TOTAL ABDOMINAL HYSTERECTOMY WITH SALPINGO OOPHORECTOMY     • TOTAL KNEE ARTHROPLASTY Left 2020    Dr. Lei      Social History     Socioeconomic History   • Marital status:    Tobacco Use   • Smoking status: Never Smoker   • Smokeless tobacco: Never Used   Vaping Use   • Vaping Use: Never used   Substance and Sexual Activity   • Alcohol use: No   • Drug use: No   • Sexual activity: Yes     Partners: Male     Birth control/protection: Surgical      Family History   Problem Relation Age of Onset   • Colon cancer Mother 60         at 63 2/2 colon cancer   • Thyroid disease Mother    • Heart attack Father         s/p CABG   • Graves' disease Brother    • Diabetes Maternal Grandmother        Review of Systems   Constitutional: Negative for chills, diaphoresis and fever.   Gastrointestinal: Negative.    Genitourinary: Negative for difficulty urinating, dysuria, pelvic pain, vaginal bleeding and vaginal discharge.           Objective   /80   Ht 163.1 cm (64.21\")   Wt 105 kg (230 lb 12.8 oz)   Breastfeeding No   BMI 39.36 kg/m²     Physical Exam  Constitutional:       Appearance: Normal appearance. She is well-developed and well-groomed.   Eyes:      General: Lids are normal.      Extraocular Movements: Extraocular movements intact.      Conjunctiva/sclera: Conjunctivae normal.   Genitourinary:     Vagina: No vaginal discharge, tenderness or lesions.      Uterus: Absent.       Adnexa:         Right: No mass or tenderness.          Left: No mass or tenderness.            Comments: Bilateral " labia majora are pink symmetrically in the hairline.  Right labia majora has 2 excoriated areas.  Possibly 2 ulcerations that are drying up.  Neurological:      General: No focal deficit present.      Mental Status: She is alert and oriented to person, place, and time.   Psychiatric:         Attention and Perception: Attention normal.         Mood and Affect: Mood normal.         Speech: Speech normal.         Behavior: Behavior is cooperative.            Result Review :                   Assessment and Plan  Diagnoses and all orders for this visit:    1. Vulvar itching (Primary)  -     Herpes Simplex Virus (HSV) 1 & 2, FREDY - ThinPrep Vial, Cervix  -     Candida panel, PCR - Swab, Vagina    2. Vulvar lesion  -     Herpes Simplex Virus (HSV) 1 & 2, FREDY - ThinPrep Vial, Cervix  -     Candida panel, PCR - Swab, Vagina    Other orders  -     clotrimazole-betamethasone (Lotrisone) 1-0.05 % cream; Apply thin layer to affected area bid for 5-7 days  Dispense: 30 g; Refill: 0      Patient Instructions   Will contact with results of swab when available.             This note was electronically signed.    Tracie Mcmahon PA-C   December 27, 2021

## 2022-01-03 ENCOUNTER — TELEPHONE (OUTPATIENT)
Dept: OBSTETRICS AND GYNECOLOGY | Facility: CLINIC | Age: 57
End: 2022-01-03

## 2022-01-03 LAB
C ALBICANS DNA VAG QL NAA+PROBE: NEGATIVE
C GLABRATA DNA VAG QL NAA+PROBE: NEGATIVE
C KRUSEI DNA VAG QL NAA+PROBE: NEGATIVE
C LUSITANIAE DNA VAG QL NAA+PROBE: NEGATIVE
CANDIDA DNA VAG QL NAA+PROBE: NEGATIVE
HSV1 DNA SPEC QL NAA+PROBE: POSITIVE
HSV2 DNA SPEC QL NAA+PROBE: NEGATIVE

## 2022-01-03 RX ORDER — VALACYCLOVIR HYDROCHLORIDE 500 MG/1
500 TABLET, FILM COATED ORAL 2 TIMES DAILY
Qty: 14 TABLET | Refills: 6 | Status: SHIPPED | OUTPATIENT
Start: 2022-01-03 | End: 2022-01-10

## 2022-01-03 NOTE — TELEPHONE ENCOUNTER
----- Message from Merline Corcoran MA sent at 1/3/2022  8:35 AM EST -----  Zoya called patient this morning, Patient states she does want the RX called in.       Zoya's Patient     [facility]    Thank You

## 2022-01-03 NOTE — TELEPHONE ENCOUNTER
Please let her know a prescription for valacyclovir with refills has been sent to [facility] pharmacy.

## 2022-01-10 ENCOUNTER — OFFICE VISIT (OUTPATIENT)
Dept: INTERNAL MEDICINE | Facility: CLINIC | Age: 57
End: 2022-01-10

## 2022-01-10 VITALS
DIASTOLIC BLOOD PRESSURE: 76 MMHG | HEART RATE: 92 BPM | SYSTOLIC BLOOD PRESSURE: 134 MMHG | TEMPERATURE: 97.1 F | BODY MASS INDEX: 39.61 KG/M2 | WEIGHT: 232 LBS | HEIGHT: 64 IN | OXYGEN SATURATION: 99 %

## 2022-01-10 DIAGNOSIS — J01.00 SUBACUTE MAXILLARY SINUSITIS: ICD-10-CM

## 2022-01-10 DIAGNOSIS — Z11.59 NEED FOR HEPATITIS C SCREENING TEST: ICD-10-CM

## 2022-01-10 DIAGNOSIS — K21.9 GASTROESOPHAGEAL REFLUX DISEASE WITHOUT ESOPHAGITIS: ICD-10-CM

## 2022-01-10 DIAGNOSIS — Z00.00 PHYSICAL EXAM, ANNUAL: Primary | ICD-10-CM

## 2022-01-10 DIAGNOSIS — F34.1 DYSTHYMIA: ICD-10-CM

## 2022-01-10 DIAGNOSIS — I10 ESSENTIAL HYPERTENSION: ICD-10-CM

## 2022-01-10 PROBLEM — B34.9 VIRAL SYNDROME: Status: RESOLVED | Noted: 2021-09-28 | Resolved: 2022-01-10

## 2022-01-10 PROCEDURE — 99396 PREV VISIT EST AGE 40-64: CPT | Performed by: INTERNAL MEDICINE

## 2022-01-10 PROCEDURE — 90471 IMMUNIZATION ADMIN: CPT | Performed by: INTERNAL MEDICINE

## 2022-01-10 PROCEDURE — 90686 IIV4 VACC NO PRSV 0.5 ML IM: CPT | Performed by: INTERNAL MEDICINE

## 2022-01-10 RX ORDER — SULFAMETHOXAZOLE AND TRIMETHOPRIM 800; 160 MG/1; MG/1
1 TABLET ORAL 2 TIMES DAILY WITH MEALS
Qty: 20 TABLET | Refills: 0 | Status: SHIPPED | OUTPATIENT
Start: 2022-01-10 | End: 2022-03-21

## 2022-01-10 RX ORDER — DULOXETIN HYDROCHLORIDE 30 MG/1
30 CAPSULE, DELAYED RELEASE ORAL DAILY
Qty: 90 CAPSULE | Refills: 3 | Status: SHIPPED | OUTPATIENT
Start: 2022-01-10 | End: 2023-01-24 | Stop reason: SDUPTHER

## 2022-01-10 RX ORDER — LISINOPRIL 5 MG/1
5 TABLET ORAL DAILY
Qty: 90 TABLET | Refills: 3 | Status: SHIPPED | OUTPATIENT
Start: 2022-01-10 | End: 2022-09-12

## 2022-01-10 NOTE — PROGRESS NOTES
Chief Complaint   Patient presents with   • Annual Exam     Physical only. Pt states she's had left shoulder pain X couple months.     Subjective   Tracie Argueta is a 56 y.o. female.     Patient is here today for annual physical exam.  PMH of HTN, GERD, depression, left knee arthritis and allergies.  HTN-BP and heart rate are well controlled today.  Her home BP usually runs good, less than 140/80s. No CP, palpitations, SOA or edema.  GERD-she is currently on Pepcid 40 mg daily for GERD.  Has not had any recent flare ups.  She is more sensitive to spices than she used to be  Depression-she remains on duloxetine 30 mg daily for depression symptoms.  She feels her mood is well controlled.  She is sleeping well  Allergies-currently on Singulair and Xyzal daily for underlying allergy symptoms.  She sees ENT for her allergies.  She also uses flonase.  Her allergies are well controlled.  She has some congestion now with PND, this is common for her to have PND in AM.  However, she does not feel well for past few weeks.  She has worsening right sinus congestion, right ear pain and plugged ear sensation on right side.  No fevers or chills.  DJD-this primarily bothers her left knee and she takes Advil as needed and usually before bed.  Recently she also complains of left shoulder pain for past several months-no known injury.  She did go see ortho for her shoulder.  She had an injection about 3-4 weeks ago, it helped a little bit.  It really hurts more in the morning. They recommend MRI and PT if she does not get better with injection.    HCM-colonoscopy 2021-she did have a few polyps.  Last Pap and mammogram in 2021-HSV-1 positive on Pap-she uses valtrex as needed. She has had her Hep A vaccines at pharmacy, only one documented in chart.  All COVID vaccines up-to-date.  She has not had shingles or seasonal flu vaccine.    She wears her seat belt, does not text and drive.    She sees dentist twice a yr and eye doctor annually.  "  She does exercise regularly, she hikes and does some regularly walking. Walks her dog twice a day, about 2 miles a day.   She has been eating a relatively healthy diet.  She has gained approximately 15 pounds over past 4 months.  Does not smoke or drink.       The following portions of the patient's history were reviewed and updated as appropriate: allergies, current medications, past family history, past medical history, past social history, past surgical history and problem list.    Review of Systems   Constitutional: Negative for activity change, appetite change, chills, fever and unexpected weight change.   HENT: Positive for congestion, ear pain, postnasal drip, sinus pressure and sinus pain.         Right ear feels plugged   Eyes: Negative for visual disturbance.   Respiratory: Negative for shortness of breath.    Cardiovascular: Negative for chest pain, palpitations and leg swelling.   Gastrointestinal: Negative.  Negative for abdominal pain.   Genitourinary: Negative for hematuria and menstrual problem.        Intermittent vaginal itching   Musculoskeletal: Positive for arthralgias. Negative for back pain.   Allergic/Immunologic: Positive for environmental allergies.   Neurological: Negative for headaches.   Psychiatric/Behavioral: Negative for dysphoric mood and sleep disturbance. The patient is not nervous/anxious.        Objective   /76   Pulse 92   Temp 97.1 °F (36.2 °C)   Ht 163.1 cm (64.21\")   Wt 105 kg (232 lb)   SpO2 99%   BMI 39.56 kg/m²   Body mass index is 39.56 kg/m².  Physical Exam  Vitals and nursing note reviewed.   Constitutional:       General: She is not in acute distress.     Appearance: Normal appearance. She is well-developed. She is obese. She is not ill-appearing.      Comments: Kind and pleasant female, appears stated age and in NAD today   HENT:      Head: Normocephalic and atraumatic.      Right Ear: Ear canal and external ear normal.      Left Ear: Tympanic membrane, " ear canal and external ear normal.      Ears:      Comments: Right TM is dull and slightly retracted     Nose: Congestion present.      Comments: Turbinates are erythematous right greater than left with yellow rhinorrhea noted, right maxillary and frontal sinus tenderness  Eyes:      General:         Right eye: No discharge.         Left eye: No discharge.      Extraocular Movements: Extraocular movements intact.      Conjunctiva/sclera: Conjunctivae normal.   Neck:      Thyroid: No thyromegaly.      Vascular: No carotid bruit.      Comments: No thyromegaly or mass  Right cervical adenopathy with tenderness  Cardiovascular:      Rate and Rhythm: Normal rate and regular rhythm.      Pulses: Normal pulses.      Heart sounds: Normal heart sounds. No murmur heard.      Pulmonary:      Effort: Pulmonary effort is normal. No respiratory distress.      Breath sounds: Normal breath sounds. No wheezing.   Abdominal:      General: Bowel sounds are normal. There is no distension.      Palpations: Abdomen is soft.      Tenderness: There is no abdominal tenderness.   Musculoskeletal:      Cervical back: Normal range of motion and neck supple. Tenderness present.      Right lower leg: No edema.      Left lower leg: No edema.      Comments: Left shoulder with limited range of motion anteriorly and laterally due to pain   Lymphadenopathy:      Cervical: Cervical adenopathy present.   Skin:     General: Skin is warm.      Findings: No rash.   Neurological:      General: No focal deficit present.      Mental Status: She is alert and oriented to person, place, and time. Mental status is at baseline.      Cranial Nerves: No cranial nerve deficit.      Motor: No weakness.      Coordination: Coordination normal.      Gait: Gait normal.   Psychiatric:         Mood and Affect: Mood normal.         Behavior: Behavior normal.         Thought Content: Thought content normal.         Judgment: Judgment normal.         Assessment/Plan   Tracie  ARETHA Argueta is here today and the following problems have been addressed:      Diagnoses and all orders for this visit:    1. Physical exam, annual (Primary)  -     CBC & Differential  -     Comprehensive Metabolic Panel  -     Hepatitis C Antibody  -     Lipid Panel With / Chol / HDL Ratio    2. Essential hypertension  -     CBC & Differential  -     Comprehensive Metabolic Panel  -     Lipid Panel With / Chol / HDL Ratio    3. Gastroesophageal reflux disease without esophagitis  -     CBC & Differential    4. Dysthymia    5. Need for hepatitis C screening test  -     Hepatitis C Antibody    6. Subacute maxillary sinusitis    Other orders  -     DULoxetine (CYMBALTA) 30 MG capsule; Take 1 capsule by mouth Daily.  Dispense: 90 capsule; Refill: 3  -     lisinopril (PRINIVIL,ZESTRIL) 5 MG tablet; Take 1 tablet by mouth Daily.  Dispense: 90 tablet; Refill: 3  -     sulfamethoxazole-trimethoprim (BACTRIM DS,SEPTRA DS) 800-160 MG per tablet; Take 1 tablet by mouth 2 (Two) Times a Day With Meals.  Dispense: 20 tablet; Refill: 0  -     FluLaval/Fluarix/Fluzone >6 Months (6468-0345)    Labs as noted  Recommend annual eye doctor appointment, or as necessary  See your dentist twice a year.  Recommend that you brush teeth twice daily and floss minimum of once daily  Recommend regular seatbelt use  Do not text or use phone while driving  Bactrim DS twice daily for 10 days due to current sinus infection  Continue Flonase, Singulair and Xyzal for current allergies and sinus symptoms, may add Mucinex if needed for symptom relief  Increase fluids and rest  Follow heart healthy/low salt diet  Avoid processed foods  Monitor blood pressure as discussed  Exercise as tolerated up to 30 minutes 5 days per week  Take all medications as prescribed  Flu vaccine provided today, consider Tdap vaccine within next 6 months to 1 year  Recommend shingles vaccine at local pharmacy  GERD is well controlled with Pepcid  Her depression is doing well with  duloxetine  Recommend pendulum exercises for left shoulder pain, wall/table slide exercises as demonstrated and follow-up with orthopedics    Return to clinic in 6 months for routine follow-up visit        Please note that portions of this note were completed with a voice recognition program.  Efforts were made to edit dictation, but occasionally words are mistranscribed.

## 2022-01-11 LAB
ALBUMIN SERPL-MCNC: 5 G/DL (ref 3.5–5.2)
ALBUMIN/GLOB SERPL: 2.3 G/DL
ALP SERPL-CCNC: 127 U/L (ref 39–117)
ALT SERPL-CCNC: 15 U/L (ref 1–33)
AST SERPL-CCNC: 22 U/L (ref 1–32)
BASOPHILS # BLD AUTO: 0.06 10*3/MM3 (ref 0–0.2)
BASOPHILS NFR BLD AUTO: 1.4 % (ref 0–1.5)
BILIRUB SERPL-MCNC: 0.8 MG/DL (ref 0–1.2)
BUN SERPL-MCNC: 17 MG/DL (ref 6–20)
BUN/CREAT SERPL: 24.6 (ref 7–25)
CALCIUM SERPL-MCNC: 9.3 MG/DL (ref 8.6–10.5)
CHLORIDE SERPL-SCNC: 101 MMOL/L (ref 98–107)
CHOLEST SERPL-MCNC: 208 MG/DL (ref 0–200)
CHOLEST/HDLC SERPL: 2.67 {RATIO}
CO2 SERPL-SCNC: 27.3 MMOL/L (ref 22–29)
CREAT SERPL-MCNC: 0.69 MG/DL (ref 0.57–1)
EOSINOPHIL # BLD AUTO: 0.12 10*3/MM3 (ref 0–0.4)
EOSINOPHIL NFR BLD AUTO: 2.8 % (ref 0.3–6.2)
ERYTHROCYTE [DISTWIDTH] IN BLOOD BY AUTOMATED COUNT: 12.6 % (ref 12.3–15.4)
GLOBULIN SER CALC-MCNC: 2.2 GM/DL
GLUCOSE SERPL-MCNC: 106 MG/DL (ref 65–99)
HCT VFR BLD AUTO: 39 % (ref 34–46.6)
HCV AB S/CO SERPL IA: 0.1 S/CO RATIO (ref 0–0.9)
HDLC SERPL-MCNC: 78 MG/DL (ref 40–60)
HGB BLD-MCNC: 13.1 G/DL (ref 12–15.9)
IMM GRANULOCYTES # BLD AUTO: 0.01 10*3/MM3 (ref 0–0.05)
IMM GRANULOCYTES NFR BLD AUTO: 0.2 % (ref 0–0.5)
LDLC SERPL CALC-MCNC: 110 MG/DL (ref 0–100)
LYMPHOCYTES # BLD AUTO: 1.7 10*3/MM3 (ref 0.7–3.1)
LYMPHOCYTES NFR BLD AUTO: 39.9 % (ref 19.6–45.3)
MCH RBC QN AUTO: 29.6 PG (ref 26.6–33)
MCHC RBC AUTO-ENTMCNC: 33.6 G/DL (ref 31.5–35.7)
MCV RBC AUTO: 88 FL (ref 79–97)
MONOCYTES # BLD AUTO: 0.26 10*3/MM3 (ref 0.1–0.9)
MONOCYTES NFR BLD AUTO: 6.1 % (ref 5–12)
NEUTROPHILS # BLD AUTO: 2.11 10*3/MM3 (ref 1.7–7)
NEUTROPHILS NFR BLD AUTO: 49.6 % (ref 42.7–76)
NRBC BLD AUTO-RTO: 0 /100 WBC (ref 0–0.2)
PLATELET # BLD AUTO: 264 10*3/MM3 (ref 140–450)
POTASSIUM SERPL-SCNC: 4.8 MMOL/L (ref 3.5–5.2)
PROT SERPL-MCNC: 7.2 G/DL (ref 6–8.5)
RBC # BLD AUTO: 4.43 10*6/MM3 (ref 3.77–5.28)
SODIUM SERPL-SCNC: 137 MMOL/L (ref 136–145)
TRIGL SERPL-MCNC: 117 MG/DL (ref 0–150)
VLDLC SERPL CALC-MCNC: 20 MG/DL (ref 5–40)
WBC # BLD AUTO: 4.26 10*3/MM3 (ref 3.4–10.8)

## 2022-01-11 NOTE — PROGRESS NOTES
Please tell patient I have mailed a copy of her labs to her.  I have filled out her wellness form, however, she did not sign it.  It will need a signature before it can be sent in.  Thank you

## 2022-01-12 ENCOUNTER — LAB (OUTPATIENT)
Dept: LAB | Facility: HOSPITAL | Age: 57
End: 2022-01-12

## 2022-01-12 DIAGNOSIS — J01.00 SUBACUTE MAXILLARY SINUSITIS: ICD-10-CM

## 2022-01-12 DIAGNOSIS — J01.00 SUBACUTE MAXILLARY SINUSITIS: Primary | ICD-10-CM

## 2022-01-12 LAB — SARS-COV-2 RNA NOSE QL NAA+PROBE: NOT DETECTED

## 2022-01-12 PROCEDURE — U0004 COV-19 TEST NON-CDC HGH THRU: HCPCS

## 2022-03-09 DIAGNOSIS — M25.512 CHRONIC LEFT SHOULDER PAIN: Primary | ICD-10-CM

## 2022-03-09 DIAGNOSIS — G89.29 CHRONIC LEFT SHOULDER PAIN: Primary | ICD-10-CM

## 2022-03-21 ENCOUNTER — OFFICE VISIT (OUTPATIENT)
Dept: ORTHOPEDIC SURGERY | Facility: CLINIC | Age: 57
End: 2022-03-21

## 2022-03-21 VITALS — RESPIRATION RATE: 18 BRPM | BODY MASS INDEX: 39.98 KG/M2 | WEIGHT: 234.2 LBS | HEIGHT: 64 IN

## 2022-03-21 DIAGNOSIS — M75.42 IMPINGEMENT SYNDROME OF LEFT SHOULDER: Primary | ICD-10-CM

## 2022-03-21 DIAGNOSIS — M25.512 ARTHRALGIA OF LEFT SHOULDER REGION: ICD-10-CM

## 2022-03-21 PROCEDURE — 99213 OFFICE O/P EST LOW 20 MIN: CPT | Performed by: PHYSICIAN ASSISTANT

## 2022-03-21 NOTE — PROGRESS NOTES
Subjective   Patient ID: Tracie Argueta is a 56 y.o. right hand dominant female  Pain of the Left Shoulder (NKI,Bursa injection in office last November provided very little relief)         History of Present Illness  Patient presents with continued left shoulder pain.   She reports only temp. Minimal relief from the last office cortisone injection. She has been taking otc motrin since the onset of pain. ( Onset ) patient had a fall during this time, which increased her left shoulder pain.   Patient has tried HEP that were instructed from her PCP for what they thought was frozen shoulder. She has still been performing these exercises since the pain began.       Past Medical History:   Diagnosis Date   • Acute otitis media    • Acute sinusitis    • Acute UTI    • Dysfunction of eustachian tube     right    • H/O bone density study    • Herpes    • History of mammogram    • History of Papanicolaou smear of cervix      [place]   • Hypertension    • Meniscus tear    • Pain in thoracic spine    • Postsurgical ovarian failure    • Sciatica    • Spinal stenosis    • Urinary symptom or sign    • URTI (acute upper respiratory infection)         Past Surgical History:   Procedure Laterality Date   •  SECTION     • COLONOSCOPY      Dr. Selina Sierra   • TOTAL ABDOMINAL HYSTERECTOMY WITH SALPINGO OOPHORECTOMY     • TOTAL KNEE ARTHROPLASTY Left 2020    Dr. Lei       Family History   Problem Relation Age of Onset   • Colon cancer Mother 60         at 63 2/2 colon cancer   • Thyroid disease Mother    • Heart attack Father         s/p CABG   • Graves' disease Brother    • Diabetes Maternal Grandmother        Social History     Socioeconomic History   • Marital status:    Tobacco Use   • Smoking status: Never Smoker   • Smokeless tobacco: Never Used   Vaping Use   • Vaping Use: Never used   Substance and Sexual Activity   • Alcohol use: No   • Drug use: No   • Sexual activity: Yes     Partners:  "Male     Birth control/protection: Surgical         Current Outpatient Medications:   •  azelastine (ASTELIN) 0.1 % nasal spray, 2 sprays into the nostril(s) as directed by provider 2 (Two) Times a Day. Use in each nostril as directed, Disp: , Rfl:   •  Cholecalciferol (VITAMIN D-3) 1000 UNITS capsule, Take  by mouth daily., Disp: , Rfl:   •  DULoxetine (CYMBALTA) 30 MG capsule, Take 1 capsule by mouth Daily., Disp: 90 capsule, Rfl: 3  •  levocetirizine (XYZAL) 5 MG tablet, Take 5 mg by mouth Every Evening., Disp: , Rfl:   •  lisinopril (PRINIVIL,ZESTRIL) 5 MG tablet, Take 1 tablet by mouth Daily., Disp: 90 tablet, Rfl: 3  •  Montelukast Sodium (SINGULAIR PO), Take  by mouth., Disp: , Rfl:   •  MULTIPLE VITAMIN PO, Take  by mouth daily., Disp: , Rfl:     Allergies   Allergen Reactions   • Penicillins Rash       Review of Systems   Constitutional: Negative for diaphoresis, fever and unexpected weight change.   HENT: Negative for dental problem and sore throat.    Eyes: Negative for visual disturbance.   Respiratory: Negative for shortness of breath.    Cardiovascular: Negative for chest pain.   Gastrointestinal: Negative for abdominal pain, constipation, diarrhea, nausea and vomiting.   Genitourinary: Negative for difficulty urinating and frequency.   Musculoskeletal: Positive for arthralgias (left shoulder).   Neurological: Negative for headaches.   Hematological: Does not bruise/bleed easily.       I have reviewed the medical and surgical history, family history, social history, medications, and/or allergies, and the review of systems of this report.    Objective   Resp 18   Ht 163.1 cm (64.2\")   Wt 106 kg (234 lb 3.2 oz)   BMI 39.95 kg/m²    Physical Exam  Vitals and nursing note reviewed.   Constitutional:       Appearance: Normal appearance.   Cardiovascular:      Pulses: Normal pulses.   Neurological:      Mental Status: She is alert and oriented to person, place, and time.       Left Shoulder Exam "     Tenderness   The patient is experiencing tenderness in the biceps tendon, acromioclavicular joint and acromion.    Range of Motion   Active abduction: 120   Passive abduction: 130   Forward flexion: 130   Internal rotation 0 degrees: Lumbar   Internal rotation 90 degrees: 70     Muscle Strength   Biceps: 4/5     Tests   Apprehension: positive  Cross arm: positive  Impingement: positive    Other   Sensation: normal  Pulse: present              Neurologic Exam     Mental Status   Oriented to person, place, and time.          + Chili's LUE  + neer sign LUE    Assessment/Plan   Independent Review of Radiographic Studies:    No new imaging done today.  Reviewed imaging with patient at a prior visit.      Procedures       Diagnoses and all orders for this visit:    1. Impingement syndrome of left shoulder (Primary)  -     MRI shoulder left wo contrast; Future    2. Arthralgia of left shoulder region  -     MRI shoulder left wo contrast; Future       Discussion of orthopedic goals  Risk, benefits, and merits of treatment alternatives reviewed with the patient and questions answered  Ice, heat, and/or modalities as beneficial    Recommendations/Plan:  Exercise, medications, injections, other patient advice, and return appointment as noted.  Patient is encouraged to call or return for any issues or concerns.    I do feel MRI is necessary considering she had a mechanical fall with continued pain she has tried conservative measures to treat her pain without improvement.  Follow up after MRI  Patient agreeable to call or return sooner for any concerns.                 EMR Dragon-transcription disclaimer:  This encounter note is an electronic transcription of spoken language to printed text.  Electronic transcription of spoken language may permit erroneous or at times nonsensical words or phrases to be inadvertently transcribed.  Although I have reviewed the note for such errors, some may still exist

## 2022-03-25 ENCOUNTER — TRANSCRIBE ORDERS (OUTPATIENT)
Dept: MAMMOGRAPHY | Facility: HOSPITAL | Age: 57
End: 2022-03-25

## 2022-03-25 DIAGNOSIS — Z12.31 VISIT FOR SCREENING MAMMOGRAM: Primary | ICD-10-CM

## 2022-04-12 ENCOUNTER — HOSPITAL ENCOUNTER (OUTPATIENT)
Dept: MAMMOGRAPHY | Facility: HOSPITAL | Age: 57
Discharge: HOME | End: 2022-04-12
Admitting: PHYSICIAN ASSISTANT

## 2022-04-12 DIAGNOSIS — Z12.31 VISIT FOR SCREENING MAMMOGRAM: ICD-10-CM

## 2022-04-12 PROCEDURE — 77067 SCR MAMMO BI INCL CAD: CPT

## 2022-04-12 PROCEDURE — 77063 BREAST TOMOSYNTHESIS BI: CPT

## 2022-04-21 ENCOUNTER — HOSPITAL ENCOUNTER (OUTPATIENT)
Dept: MRI IMAGING | Facility: HOSPITAL | Age: 57
Discharge: HOME | End: 2022-04-21
Admitting: PHYSICIAN ASSISTANT

## 2022-04-21 DIAGNOSIS — M25.512 ARTHRALGIA OF LEFT SHOULDER REGION: ICD-10-CM

## 2022-04-21 DIAGNOSIS — M75.42 IMPINGEMENT SYNDROME OF LEFT SHOULDER: ICD-10-CM

## 2022-04-21 PROCEDURE — 73221 MRI JOINT UPR EXTREM W/O DYE: CPT

## 2022-04-26 ENCOUNTER — OFFICE VISIT (OUTPATIENT)
Dept: ORTHOPEDIC SURGERY | Facility: CLINIC | Age: 57
End: 2022-04-26

## 2022-04-26 VITALS — WEIGHT: 235 LBS | BODY MASS INDEX: 40.12 KG/M2 | TEMPERATURE: 97.8 F | HEIGHT: 64 IN

## 2022-04-26 DIAGNOSIS — S43.432A SUPERIOR GLENOID LABRUM LESION OF LEFT SHOULDER, INITIAL ENCOUNTER: ICD-10-CM

## 2022-04-26 DIAGNOSIS — M75.112 INCOMPLETE TEAR OF LEFT ROTATOR CUFF, UNSPECIFIED WHETHER TRAUMATIC: ICD-10-CM

## 2022-04-26 DIAGNOSIS — M75.42 IMPINGEMENT SYNDROME OF LEFT SHOULDER: Primary | ICD-10-CM

## 2022-04-26 DIAGNOSIS — M25.512 ARTHRALGIA OF LEFT SHOULDER REGION: ICD-10-CM

## 2022-04-26 DIAGNOSIS — M19.019 AC JOINT ARTHROPATHY: ICD-10-CM

## 2022-04-26 PROCEDURE — 99213 OFFICE O/P EST LOW 20 MIN: CPT | Performed by: PHYSICIAN ASSISTANT

## 2022-04-26 NOTE — PROGRESS NOTES
Subjective   Patient ID: Tracie Argueta is a 56 y.o. right hand dominant female  Follow-up of the Left Shoulder (MRI results.)         History of Present Illness  Patient is following up to review MRI results of the left shoulder.   She Is still having discomfort to the shoulder.   The pain keeps her from doing daily activity.  Patient has been experiencing left shoulder pain since 2021.  To date, she has tried home exercises and left shoulder articular injection without improvement.                                                   Past Medical History:   Diagnosis Date   • Acute otitis media    • Acute sinusitis    • Acute UTI    • Dysfunction of eustachian tube     right    • H/O bone density study    • Herpes    • History of mammogram    • History of Papanicolaou smear of cervix      [place]   • Hypertension    • Meniscus tear    • Pain in thoracic spine    • Postsurgical ovarian failure    • Sciatica    • Spinal stenosis    • Urinary symptom or sign    • URTI (acute upper respiratory infection)         Past Surgical History:   Procedure Laterality Date   •  SECTION     • COLONOSCOPY      Dr. Selina Sierra   • TOTAL ABDOMINAL HYSTERECTOMY WITH SALPINGO OOPHORECTOMY     • TOTAL KNEE ARTHROPLASTY Left 2020    Dr. Lei       Family History   Problem Relation Age of Onset   • Colon cancer Mother 60         at 63 2/2 colon cancer   • Thyroid disease Mother    • Heart attack Father         s/p CABG   • Graves' disease Brother    • Diabetes Maternal Grandmother    • Breast cancer Neg Hx        Social History     Socioeconomic History   • Marital status:    Tobacco Use   • Smoking status: Never Smoker   • Smokeless tobacco: Never Used   Vaping Use   • Vaping Use: Never used   Substance and Sexual Activity   • Alcohol use: No   • Drug use: No   • Sexual activity: Yes     Partners: Male     Birth control/protection: Surgical         Current Outpatient Medications:   •  azelastine  "(ASTELIN) 0.1 % nasal spray, 2 sprays into the nostril(s) as directed by provider 2 (Two) Times a Day. Use in each nostril as directed, Disp: , Rfl:   •  Cholecalciferol (VITAMIN D-3) 1000 UNITS capsule, Take  by mouth daily., Disp: , Rfl:   •  DULoxetine (CYMBALTA) 30 MG capsule, Take 1 capsule by mouth Daily., Disp: 90 capsule, Rfl: 3  •  levocetirizine (XYZAL) 5 MG tablet, Take 5 mg by mouth Every Evening., Disp: , Rfl:   •  lisinopril (PRINIVIL,ZESTRIL) 5 MG tablet, Take 1 tablet by mouth Daily., Disp: 90 tablet, Rfl: 3  •  Montelukast Sodium (SINGULAIR PO), Take  by mouth., Disp: , Rfl:   •  MULTIPLE VITAMIN PO, Take  by mouth daily., Disp: , Rfl:     Allergies   Allergen Reactions   • Penicillins Rash       Review of Systems   Constitutional: Negative for fever.   HENT: Negative for dental problem and voice change.    Eyes: Negative for visual disturbance.   Respiratory: Negative for shortness of breath.    Cardiovascular: Negative for chest pain.   Gastrointestinal: Negative for abdominal pain.   Genitourinary: Negative for dysuria.   Musculoskeletal: Positive for arthralgias (left shoulder). Negative for gait problem and joint swelling.   Skin: Negative for rash.   Neurological: Negative for speech difficulty.   Hematological: Does not bruise/bleed easily.   Psychiatric/Behavioral: Negative for confusion.       I have reviewed the medical and surgical history, family history, social history, medications, and/or allergies, and the review of systems of this report.    Objective   Temp 97.8 °F (36.6 °C)   Ht 163.1 cm (64.21\")   Wt 107 kg (235 lb)   BMI 40.07 kg/m²    Physical Exam  Vitals and nursing note reviewed.   Constitutional:       Appearance: Normal appearance.   Cardiovascular:      Rate and Rhythm: Normal rate.      Pulses: Normal pulses.   Pulmonary:      Effort: Pulmonary effort is normal.   Abdominal:      General: There is no distension.   Musculoskeletal:      Left shoulder: Tenderness and " bony tenderness present. No deformity, laceration or crepitus. Normal range of motion.   Neurological:      Mental Status: She is alert and oriented to person, place, and time.   Psychiatric:         Behavior: Behavior normal.       Left Shoulder Exam     Tenderness   The patient is experiencing tenderness in the acromioclavicular joint.    Range of Motion   Active abduction: 120   Forward flexion: 130   Internal rotation 0 degrees: Lumbar   Internal rotation 90 degrees: 70     Muscle Strength   The patient has normal left shoulder strength.    Tests   Apprehension: positive  Cross arm: positive  Impingement: positive    Other   Pulse: present     Comments:  + o'Shaq's test             Neurologic Exam     Mental Status   Oriented to person, place, and time.              Assessment/Plan   Independent Review of Radiographic Studies:    No new imaging done today.  Narrative & Impression   PROCEDURE: MRI SHOULDER LEFT WO CONTRAST-     HISTORY: Shoulder pain, rotator cuff disorder suspected, xray done;  M75.42-Impingement syndrome of left shoulder; M25.512-Pain in left  shoulder     PROCEDURE: Multiplanar multisequence imaging of the shoulder was  performed.     FINDINGS: There is tendinosis of the supraspinatus and subscapularis  tendons. There is a partial-thickness articular sided tear of the  subscapularis tendon. There is no evidence of abnormal rotator cuff  muscle edema or atrophy. The biceps tendon follows its normal  intra-articular course and is located within the bicipital groove. There  is abnormal signal in the superior labrum at the biceps tendon anchor.  There is no joint capsule thickening. There is a type II acromion.  Hypertrophic degenerative changes are present at the acromioclavicular  joint.     IMPRESSION:     1. Tendinosis of the supraspinatus and subscapularis tendons with a  partial-thickness tear of the subscapularis tendon.  2. Abnormal signal in the superior labrum at the biceps tendon  anchor  which may reflect a SLAP tear.  3. Acromioclavicular joint space arthrosis.     This report was signed and finalized on 4/21/2022 9:46 AM by Kaycee Ordaz M.D..         Procedures       Diagnoses and all orders for this visit:    1. Impingement syndrome of left shoulder (Primary)    2. Arthralgia of left shoulder region    3. AC joint arthropathy    4. Superior glenoid labrum lesion of left shoulder, initial encounter    5. Incomplete tear of left rotator cuff, unspecified whether traumatic       Discussion of orthopedic goals  Risk, benefits, and merits of treatment alternatives reviewed with the patient and questions answered  The nature of the proposed surgery reviewed with the patient including risks, benefits, rehabilitation, recovery timeframe, and outcome expectations  Ice, heat, and/or modalities as beneficial    Recommendations/Plan:  Exercise, medications, injections, other patient advice, and return appointment as noted.  Patient is encouraged to call or return for any issues or concerns.    PLAN: Left shoulder ATS SAD, DCE, acromioplasty, labral debridement vs. Repair with related procedures. Explore RTC and repair if needed    Patient agreeable to call or return sooner for any concerns.      EMR Dragon-transcription disclaimer:  This encounter note is an electronic transcription of spoken language to printed text.  Electronic transcription of spoken language may permit erroneous or at times nonsensical words or phrases to be inadvertently transcribed.  Although I have reviewed the note for such errors, some may still exist

## 2022-07-21 ENCOUNTER — OFFICE VISIT (OUTPATIENT)
Dept: INTERNAL MEDICINE | Facility: CLINIC | Age: 57
End: 2022-07-21

## 2022-07-21 VITALS
SYSTOLIC BLOOD PRESSURE: 140 MMHG | OXYGEN SATURATION: 99 % | WEIGHT: 234 LBS | HEART RATE: 88 BPM | HEIGHT: 64 IN | DIASTOLIC BLOOD PRESSURE: 82 MMHG | BODY MASS INDEX: 39.95 KG/M2 | TEMPERATURE: 97.3 F

## 2022-07-21 DIAGNOSIS — M15.9 PRIMARY OSTEOARTHRITIS INVOLVING MULTIPLE JOINTS: ICD-10-CM

## 2022-07-21 DIAGNOSIS — J30.1 SEASONAL ALLERGIC RHINITIS DUE TO POLLEN: ICD-10-CM

## 2022-07-21 DIAGNOSIS — F34.1 DYSTHYMIA: ICD-10-CM

## 2022-07-21 DIAGNOSIS — I10 ESSENTIAL HYPERTENSION: Primary | ICD-10-CM

## 2022-07-21 PROBLEM — M17.12 PRIMARY OSTEOARTHRITIS OF LEFT KNEE: Status: RESOLVED | Noted: 2019-12-31 | Resolved: 2022-07-21

## 2022-07-21 PROBLEM — Z11.59 NEED FOR HEPATITIS C SCREENING TEST: Status: RESOLVED | Noted: 2022-01-10 | Resolved: 2022-07-21

## 2022-07-21 PROBLEM — M15.0 PRIMARY OSTEOARTHRITIS INVOLVING MULTIPLE JOINTS: Status: ACTIVE | Noted: 2022-07-21

## 2022-07-21 PROBLEM — J01.00 SUBACUTE MAXILLARY SINUSITIS: Status: RESOLVED | Noted: 2022-01-10 | Resolved: 2022-07-21

## 2022-07-21 PROCEDURE — 99214 OFFICE O/P EST MOD 30 MIN: CPT | Performed by: INTERNAL MEDICINE

## 2022-07-21 NOTE — PROGRESS NOTES
Chief Complaint   Patient presents with   • Follow-up     Subjective   Tracie Argueta is a 57 y.o. female.     Here today for follow-up of HTN, GERD, depression, left knee arthritis and allergies.  HTN-BP is slightly elevated today.  Her home BP usually runs good, less than 140/80s. No CP, palpitations, SOA or edema.  She does not check it regularly here.  She has some stress before she got here and feels that may be why it is high today.    GERD-  She is off pepcid and does not have much GERD, she avoids her triggers  Depression-she remains on duloxetine 30 mg daily for depression symptoms.  She feels her mood is well controlled.  She is sleeping well  Allergies-currently on Singulair and Xyzal daily for underlying allergy symptoms.  She sees ENT for her allergies.  She also uses flonase.  Her allergies are well controlled.  She feels her ears are full right now.  She was exposed to COVID a few days ago.   DJD-this primarily bothers her left knee and she takes Advil as needed and usually before bed.  She has been seeing orthopedics for impingement of her shoulder also.  She was told she needed surgery and did not do this, she is doing some home therapy and has better ROM.  She is just dealing with it for now.    HCM-Pap December 2021.  Mammogram April 2022.  Colonoscopy July 2021.  She has had seasonal flu vaccine, both COVID vaccines and 1 booster.       The following portions of the patient's history were reviewed and updated as appropriate: allergies, current medications, past family history, past medical history, past social history, past surgical history and problem list.    Review of Systems   Constitutional: Negative for activity change, appetite change and unexpected weight change.   Eyes: Negative for visual disturbance.   Respiratory: Negative for shortness of breath.    Cardiovascular: Negative for chest pain, palpitations and leg swelling.   Gastrointestinal: Negative for abdominal pain.  "  Genitourinary: Negative for hematuria.   Musculoskeletal: Positive for arthralgias. Negative for back pain.   Allergic/Immunologic: Positive for environmental allergies.   Neurological: Negative for headaches.   Psychiatric/Behavioral: Negative for dysphoric mood and sleep disturbance. The patient is nervous/anxious.        Objective   /82   Pulse 88   Temp 97.3 °F (36.3 °C)   Ht 163.1 cm (64.21\")   Wt 106 kg (234 lb)   SpO2 99%   BMI 39.90 kg/m²   Body mass index is 39.9 kg/m².  Physical Exam  Vitals and nursing note reviewed.   Constitutional:       General: She is not in acute distress.     Appearance: Normal appearance. She is well-developed. She is obese. She is not ill-appearing.      Comments: Kind and pleasant female, appears stated age and in NAD today   HENT:      Head: Normocephalic and atraumatic.      Right Ear: Ear canal and external ear normal.      Left Ear: Ear canal and external ear normal.      Ears:      Comments: TMs with fluid noted bilaterally right worse than left     Nose: No congestion.      Mouth/Throat:      Pharynx: No posterior oropharyngeal erythema.   Eyes:      General:         Right eye: No discharge.         Left eye: No discharge.      Extraocular Movements: Extraocular movements intact.      Conjunctiva/sclera: Conjunctivae normal.   Neck:      Thyroid: No thyromegaly.      Vascular: No carotid bruit.      Comments: No thyromegaly or mass  Cardiovascular:      Rate and Rhythm: Normal rate and regular rhythm.      Pulses: Normal pulses.      Heart sounds: Normal heart sounds. No murmur heard.  Pulmonary:      Effort: Pulmonary effort is normal. No respiratory distress.      Breath sounds: Normal breath sounds. No wheezing.   Abdominal:      General: Bowel sounds are normal. There is no distension.      Palpations: Abdomen is soft.      Tenderness: There is no abdominal tenderness.   Musculoskeletal:         General: Normal range of motion.      Cervical back: Normal " range of motion and neck supple.      Right lower leg: No edema.      Left lower leg: No edema.   Lymphadenopathy:      Cervical: No cervical adenopathy.   Skin:     General: Skin is warm.      Findings: No rash.   Neurological:      General: No focal deficit present.      Mental Status: She is alert and oriented to person, place, and time. Mental status is at baseline.      Cranial Nerves: No cranial nerve deficit.      Motor: No weakness.      Coordination: Coordination normal.      Gait: Gait normal.   Psychiatric:         Mood and Affect: Mood normal.         Behavior: Behavior normal.         Thought Content: Thought content normal.         Judgment: Judgment normal.         Assessment & Plan   Tracie Argueta is here today and the following problems have been addressed:      Diagnoses and all orders for this visit:    1. Essential hypertension (Primary)    2. Dysthymia    3. Seasonal allergic rhinitis due to pollen    4. Primary osteoarthritis involving multiple joints        Follow heart healthy/low salt diet  Avoid processed foods  Monitor blood pressure as discussed and send me readings in a few weeks  Exercise as tolerated up to 150 minutes per week  Take all medications as prescribed  Continue duloxetine for mood and DJD sxs  Recommend coricidin HCP cold and flu twice daily for current URI sxs  Check BS daily and report reads in a few weeks due to elevated BS on last labs    Return in about 6 months (around 1/21/2023) for Annual.      Marilyn K. Vermeesch, MD      Please note that portions of this note were completed with a voice recognition program.  Efforts were made to edit dictation, but occasionally words are mistranscribed.

## 2022-09-12 RX ORDER — LISINOPRIL 10 MG/1
10 TABLET ORAL DAILY
Qty: 90 TABLET | Refills: 1 | Status: SHIPPED | OUTPATIENT
Start: 2022-09-12 | End: 2023-01-24 | Stop reason: SDUPTHER

## 2022-12-08 ENCOUNTER — OFFICE VISIT (OUTPATIENT)
Dept: INTERNAL MEDICINE | Facility: CLINIC | Age: 57
End: 2022-12-08

## 2022-12-08 VITALS
HEIGHT: 64 IN | OXYGEN SATURATION: 99 % | DIASTOLIC BLOOD PRESSURE: 82 MMHG | TEMPERATURE: 97 F | SYSTOLIC BLOOD PRESSURE: 122 MMHG | WEIGHT: 238 LBS | BODY MASS INDEX: 40.63 KG/M2 | HEART RATE: 88 BPM

## 2022-12-08 DIAGNOSIS — R06.02 SHORTNESS OF BREATH: ICD-10-CM

## 2022-12-08 DIAGNOSIS — J01.00 ACUTE NON-RECURRENT MAXILLARY SINUSITIS: Primary | ICD-10-CM

## 2022-12-08 PROCEDURE — 87428 SARSCOV & INF VIR A&B AG IA: CPT | Performed by: FAMILY MEDICINE

## 2022-12-08 PROCEDURE — 99213 OFFICE O/P EST LOW 20 MIN: CPT | Performed by: FAMILY MEDICINE

## 2022-12-08 RX ORDER — BENZONATATE 200 MG/1
200 CAPSULE ORAL 3 TIMES DAILY PRN
Qty: 30 CAPSULE | Refills: 0 | Status: SHIPPED | OUTPATIENT
Start: 2022-12-08 | End: 2023-01-24

## 2022-12-08 RX ORDER — METHYLPREDNISOLONE 4 MG/1
TABLET ORAL
Qty: 21 EACH | Refills: 0 | Status: SHIPPED | OUTPATIENT
Start: 2022-12-08 | End: 2023-01-24

## 2022-12-08 RX ORDER — CEFDINIR 300 MG/1
300 CAPSULE ORAL 2 TIMES DAILY
Qty: 20 CAPSULE | Refills: 0 | Status: SHIPPED | OUTPATIENT
Start: 2022-12-08 | End: 2023-01-24

## 2022-12-08 RX ORDER — AZELASTINE 1 MG/ML
2 SPRAY, METERED NASAL 2 TIMES DAILY
Qty: 30 ML | Refills: 0 | Status: SHIPPED | OUTPATIENT
Start: 2022-12-08

## 2022-12-08 NOTE — PROGRESS NOTES
Tracie Argueta is a 57 y.o. female.    Chief Complaint   Patient presents with   • Cough   • Shortness of Breath   • Fatigue   • Abstract     Feels like she may have had, an allergic response to mold.        HPI   Patient reports drainage, congestion, cough this morning after raking leaves yesterday.  She reports ear congestion and sore throat.  She does admit to shortness of breath that began immediately after raking leaves. Also admits to sinus pressure and dental pain.   She takes xyzal and flonase on a daily basis.  She just ran out of flonase.   She has been out of singulair for a couple of months.   Also out of astelin for a week.     The following portions of the patient's history were reviewed and updated as appropriate: allergies, current medications, past family history, past medical history, past social history, past surgical history and problem list.     Allergies   Allergen Reactions   • Penicillins Rash         Current Outpatient Medications:   •  azelastine (ASTELIN) 0.1 % nasal spray, 2 sprays into the nostril(s) as directed by provider 2 (Two) Times a Day. Use in each nostril as directed, Disp: 30 mL, Rfl: 0  •  Cholecalciferol (VITAMIN D-3) 1000 UNITS capsule, Take  by mouth daily., Disp: , Rfl:   •  DULoxetine (CYMBALTA) 30 MG capsule, Take 1 capsule by mouth Daily., Disp: 90 capsule, Rfl: 3  •  levocetirizine (XYZAL) 5 MG tablet, Take 5 mg by mouth Every Evening., Disp: , Rfl:   •  lisinopril (PRINIVIL,ZESTRIL) 10 MG tablet, Take 1 tablet by mouth Daily., Disp: 90 tablet, Rfl: 1  •  Montelukast Sodium (SINGULAIR PO), Take  by mouth., Disp: , Rfl:   •  MULTIPLE VITAMIN PO, Take  by mouth daily., Disp: , Rfl:   •  benzonatate (TESSALON) 200 MG capsule, Take 1 capsule by mouth 3 (Three) Times a Day As Needed for Cough., Disp: 30 capsule, Rfl: 0  •  cefdinir (OMNICEF) 300 MG capsule, Take 1 capsule by mouth 2 (Two) Times a Day., Disp: 20 capsule, Rfl: 0  •  methylPREDNISolone (MEDROL) 4 MG dose pack,  "Take as directed on package instructions., Disp: 21 each, Rfl: 0    ROS    Review of Systems   Constitutional: Positive for fatigue. Negative for fever.   HENT: Positive for congestion, postnasal drip, sinus pressure and sore throat.    Respiratory: Positive for cough, chest tightness (minimal) and shortness of breath.    Cardiovascular: Positive for chest pain.       Vitals:    12/08/22 1138   BP: 122/82   BP Location: Right arm   Patient Position: Sitting   Cuff Size: Adult   Pulse: 88   Temp: 97 °F (36.1 °C)   SpO2: 99%   Weight: 108 kg (238 lb)   Height: 163.1 cm (64.21\")   PainSc: 0-No pain     Body mass index is 40.59 kg/m².    Physical Exam     Physical Exam  Constitutional:       General: She is not in acute distress.     Appearance: She is well-developed. She is ill-appearing.   HENT:      Head: Normocephalic and atraumatic.      Right Ear: External ear normal.      Left Ear: External ear normal.      Ears:      Comments: TM cloudy     Nose:      Right Sinus: Maxillary sinus tenderness present. No frontal sinus tenderness.      Left Sinus: Maxillary sinus tenderness present. No frontal sinus tenderness.      Mouth/Throat:      Pharynx: Posterior oropharyngeal erythema (PND) present.   Eyes:      Extraocular Movements: Extraocular movements intact.      Conjunctiva/sclera: Conjunctivae normal.   Cardiovascular:      Rate and Rhythm: Normal rate and regular rhythm.      Heart sounds: No murmur heard.  Pulmonary:      Effort: Pulmonary effort is normal. No respiratory distress.      Breath sounds: Normal breath sounds. No wheezing.   Abdominal:      General: There is no distension.   Musculoskeletal:      Cervical back: Neck supple.   Lymphadenopathy:      Cervical: No cervical adenopathy.   Skin:     General: Skin is warm and dry.   Neurological:      Mental Status: She is alert and oriented to person, place, and time.      Cranial Nerves: No cranial nerve deficit.   Psychiatric:         Mood and Affect: Mood " normal.         Behavior: Behavior normal.         Assessment/Plan    Diagnoses and all orders for this visit:    1. Acute non-recurrent maxillary sinusitis (Primary)  Assessment & Plan:  Likely secondary to allergen exposure and being out of certain allergy medications for a few days.  Patient to  flonase OTC.  Astelin nasal spray sent in.  Stop OTC epinephrine inhaler.  Will treat with cefdinir.  Of note, COVID and flu tests are negative.       2. Shortness of breath  Assessment & Plan:  May be secondary to allergen exposure.  However, discussed possibility of cardiac etiology.  As above, she does exhibit findings consistent with sinusitis.  She has been advised she should be cautious about shortness of breath, and if worsens or persists, she does need to proceed to the ED to rule out cardiac abnormality.  Patient is understanding of this.     Orders:  -     POCT SARS-CoV-2 Antigen JULIANNA + Flu    Other orders  -     cefdinir (OMNICEF) 300 MG capsule; Take 1 capsule by mouth 2 (Two) Times a Day.  Dispense: 20 capsule; Refill: 0  -     methylPREDNISolone (MEDROL) 4 MG dose pack; Take as directed on package instructions.  Dispense: 21 each; Refill: 0  -     benzonatate (TESSALON) 200 MG capsule; Take 1 capsule by mouth 3 (Three) Times a Day As Needed for Cough.  Dispense: 30 capsule; Refill: 0  -     azelastine (ASTELIN) 0.1 % nasal spray; 2 sprays into the nostril(s) as directed by provider 2 (Two) Times a Day. Use in each nostril as directed  Dispense: 30 mL; Refill: 0      New Medications Ordered This Visit   Medications   • cefdinir (OMNICEF) 300 MG capsule     Sig: Take 1 capsule by mouth 2 (Two) Times a Day.     Dispense:  20 capsule     Refill:  0   • methylPREDNISolone (MEDROL) 4 MG dose pack     Sig: Take as directed on package instructions.     Dispense:  21 each     Refill:  0   • benzonatate (TESSALON) 200 MG capsule     Sig: Take 1 capsule by mouth 3 (Three) Times a Day As Needed for Cough.      Dispense:  30 capsule     Refill:  0   • azelastine (ASTELIN) 0.1 % nasal spray     Si sprays into the nostril(s) as directed by provider 2 (Two) Times a Day. Use in each nostril as directed     Dispense:  30 mL     Refill:  0       No orders of the defined types were placed in this encounter.      No follow-ups on file.    Alethea Carlson DO

## 2022-12-09 PROBLEM — R06.02 SHORTNESS OF BREATH: Status: ACTIVE | Noted: 2022-12-09

## 2022-12-09 LAB
EXPIRATION DATE: NORMAL
FLUAV AG UPPER RESP QL IA.RAPID: NOT DETECTED
FLUBV AG UPPER RESP QL IA.RAPID: NOT DETECTED
INTERNAL CONTROL: NORMAL
Lab: NORMAL
SARS-COV-2 AG UPPER RESP QL IA.RAPID: NOT DETECTED

## 2022-12-09 NOTE — ASSESSMENT & PLAN NOTE
May be secondary to allergen exposure.  However, discussed possibility of cardiac etiology.  As above, she does exhibit findings consistent with sinusitis.  She has been advised she should be cautious about shortness of breath, and if worsens or persists, she does need to proceed to the ED to rule out cardiac abnormality.  Patient is understanding of this.

## 2022-12-09 NOTE — ASSESSMENT & PLAN NOTE
Likely secondary to allergen exposure and being out of certain allergy medications for a few days.  Patient to  flonase OTC.  Astelin nasal spray sent in.  Stop OTC epinephrine inhaler.  Will treat with cefdinir.  Of note, COVID and flu tests are negative.

## 2023-01-24 ENCOUNTER — OFFICE VISIT (OUTPATIENT)
Dept: INTERNAL MEDICINE | Facility: CLINIC | Age: 58
End: 2023-01-24
Payer: COMMERCIAL

## 2023-01-24 ENCOUNTER — PATIENT MESSAGE (OUTPATIENT)
Dept: INTERNAL MEDICINE | Facility: CLINIC | Age: 58
End: 2023-01-24

## 2023-01-24 VITALS
OXYGEN SATURATION: 98 % | BODY MASS INDEX: 38.93 KG/M2 | SYSTOLIC BLOOD PRESSURE: 134 MMHG | HEART RATE: 91 BPM | HEIGHT: 64 IN | TEMPERATURE: 97.7 F | WEIGHT: 228 LBS | DIASTOLIC BLOOD PRESSURE: 80 MMHG

## 2023-01-24 DIAGNOSIS — I10 ESSENTIAL HYPERTENSION: ICD-10-CM

## 2023-01-24 DIAGNOSIS — F34.1 DYSTHYMIA: ICD-10-CM

## 2023-01-24 DIAGNOSIS — Z12.31 SCREENING MAMMOGRAM, ENCOUNTER FOR: ICD-10-CM

## 2023-01-24 DIAGNOSIS — K21.9 GASTROESOPHAGEAL REFLUX DISEASE WITHOUT ESOPHAGITIS: ICD-10-CM

## 2023-01-24 DIAGNOSIS — R00.2 INTERMITTENT PALPITATIONS: ICD-10-CM

## 2023-01-24 DIAGNOSIS — J30.1 SEASONAL ALLERGIC RHINITIS DUE TO POLLEN: ICD-10-CM

## 2023-01-24 DIAGNOSIS — Z00.00 PHYSICAL EXAM, ANNUAL: Primary | ICD-10-CM

## 2023-01-24 PROBLEM — R06.02 SHORTNESS OF BREATH: Status: RESOLVED | Noted: 2022-12-09 | Resolved: 2023-01-24

## 2023-01-24 PROBLEM — J01.00 ACUTE NON-RECURRENT MAXILLARY SINUSITIS: Status: RESOLVED | Noted: 2022-01-10 | Resolved: 2023-01-24

## 2023-01-24 LAB
ALBUMIN SERPL-MCNC: 4.6 G/DL (ref 3.5–5.2)
ALBUMIN/GLOB SERPL: 1.6 G/DL
ALP SERPL-CCNC: 137 U/L (ref 39–117)
ALT SERPL-CCNC: 17 U/L (ref 1–33)
AST SERPL-CCNC: 22 U/L (ref 1–32)
BASOPHILS # BLD AUTO: 0.07 10*3/MM3 (ref 0–0.2)
BASOPHILS NFR BLD AUTO: 1.3 % (ref 0–1.5)
BILIRUB SERPL-MCNC: 1.1 MG/DL (ref 0–1.2)
BUN SERPL-MCNC: 15 MG/DL (ref 6–20)
BUN/CREAT SERPL: 20.5 (ref 7–25)
CALCIUM SERPL-MCNC: 9.5 MG/DL (ref 8.6–10.5)
CHLORIDE SERPL-SCNC: 102 MMOL/L (ref 98–107)
CHOLEST SERPL-MCNC: 205 MG/DL (ref 0–200)
CHOLEST/HDLC SERPL: 2.77 {RATIO}
CO2 SERPL-SCNC: 29.1 MMOL/L (ref 22–29)
CREAT SERPL-MCNC: 0.73 MG/DL (ref 0.57–1)
EGFRCR SERPLBLD CKD-EPI 2021: 97.3 ML/MIN/1.73
EOSINOPHIL # BLD AUTO: 0.23 10*3/MM3 (ref 0–0.4)
EOSINOPHIL NFR BLD AUTO: 4.3 % (ref 0.3–6.2)
ERYTHROCYTE [DISTWIDTH] IN BLOOD BY AUTOMATED COUNT: 12.9 % (ref 12.3–15.4)
GLOBULIN SER CALC-MCNC: 2.8 GM/DL
GLUCOSE SERPL-MCNC: 108 MG/DL (ref 65–99)
HCT VFR BLD AUTO: 39.2 % (ref 34–46.6)
HDLC SERPL-MCNC: 74 MG/DL (ref 40–60)
HGB BLD-MCNC: 13.2 G/DL (ref 12–15.9)
IMM GRANULOCYTES # BLD AUTO: 0.01 10*3/MM3 (ref 0–0.05)
IMM GRANULOCYTES NFR BLD AUTO: 0.2 % (ref 0–0.5)
LDLC SERPL CALC-MCNC: 109 MG/DL (ref 0–100)
LYMPHOCYTES # BLD AUTO: 2.04 10*3/MM3 (ref 0.7–3.1)
LYMPHOCYTES NFR BLD AUTO: 37.8 % (ref 19.6–45.3)
MCH RBC QN AUTO: 30.2 PG (ref 26.6–33)
MCHC RBC AUTO-ENTMCNC: 33.7 G/DL (ref 31.5–35.7)
MCV RBC AUTO: 89.7 FL (ref 79–97)
MONOCYTES # BLD AUTO: 0.44 10*3/MM3 (ref 0.1–0.9)
MONOCYTES NFR BLD AUTO: 8.1 % (ref 5–12)
NEUTROPHILS # BLD AUTO: 2.61 10*3/MM3 (ref 1.7–7)
NEUTROPHILS NFR BLD AUTO: 48.3 % (ref 42.7–76)
NRBC BLD AUTO-RTO: 0 /100 WBC (ref 0–0.2)
PLATELET # BLD AUTO: 243 10*3/MM3 (ref 140–450)
POTASSIUM SERPL-SCNC: 4.9 MMOL/L (ref 3.5–5.2)
PROT SERPL-MCNC: 7.4 G/DL (ref 6–8.5)
RBC # BLD AUTO: 4.37 10*6/MM3 (ref 3.77–5.28)
SODIUM SERPL-SCNC: 138 MMOL/L (ref 136–145)
T4 FREE SERPL-MCNC: 1.12 NG/DL (ref 0.93–1.7)
TRIGL SERPL-MCNC: 129 MG/DL (ref 0–150)
TSH SERPL DL<=0.005 MIU/L-ACNC: 1.46 UIU/ML (ref 0.27–4.2)
VLDLC SERPL CALC-MCNC: 22 MG/DL (ref 5–40)
WBC # BLD AUTO: 5.4 10*3/MM3 (ref 3.4–10.8)

## 2023-01-24 PROCEDURE — 90471 IMMUNIZATION ADMIN: CPT | Performed by: INTERNAL MEDICINE

## 2023-01-24 PROCEDURE — 93000 ELECTROCARDIOGRAM COMPLETE: CPT | Performed by: INTERNAL MEDICINE

## 2023-01-24 PROCEDURE — 99396 PREV VISIT EST AGE 40-64: CPT | Performed by: INTERNAL MEDICINE

## 2023-01-24 PROCEDURE — 90686 IIV4 VACC NO PRSV 0.5 ML IM: CPT | Performed by: INTERNAL MEDICINE

## 2023-01-24 RX ORDER — MONTELUKAST SODIUM 10 MG/1
10 TABLET ORAL NIGHTLY
Qty: 90 TABLET | Refills: 0 | Status: SHIPPED | OUTPATIENT
Start: 2023-01-24 | End: 2023-01-24 | Stop reason: SDUPTHER

## 2023-01-24 RX ORDER — DULOXETIN HYDROCHLORIDE 30 MG/1
30 CAPSULE, DELAYED RELEASE ORAL DAILY
Qty: 90 CAPSULE | Refills: 1 | Status: SHIPPED | OUTPATIENT
Start: 2023-01-24

## 2023-01-24 RX ORDER — LISINOPRIL 10 MG/1
10 TABLET ORAL DAILY
Qty: 90 TABLET | Refills: 1 | Status: SHIPPED | OUTPATIENT
Start: 2023-01-24

## 2023-01-24 RX ORDER — MONTELUKAST SODIUM 10 MG/1
10 TABLET ORAL NIGHTLY
Qty: 30 TABLET | Refills: 0 | Status: SHIPPED | OUTPATIENT
Start: 2023-01-24

## 2023-01-24 NOTE — PROGRESS NOTES
Chief Complaint   Patient presents with   • Annual Exam     Physical only.      Subjective   Tracie Argueta is a 57 y.o. female.     History of Present Illness  Patient is here today for annual physical exam.  PMH of HTN, GERD, depression, left knee arthritis and allergies.  HTN-BP and heart rate are well controlled today.  Her home BP usually runs good, less than 130/100s-this was due to exertion, is usually much less than this. No CP, palpitations, SOA or edema.  She states she has been feeling her heart flip over every once in a while. She may have some heaviness in chest while sitting.  While in [employer] last week, she got episode of being hot and clammy, she felt a flop in heart, but no palpitations.  No heaviness in chest.  GERD- no GERD sxs at this time  Depression-she remains on duloxetine 30 mg daily for depression symptoms.  She feels her mood is well controlled.  She is sleeping well  Allergies-currently Xyzal daily for underlying allergy symptoms. She has not been on singulair recently. Has tried to get RF from her ENT. She sees ENT for her allergies.  She has been congested recently, past 5 days. Singulair helps her congestion and breathing.  DJD-she had a left TKR and has no pain.  She is doing exercises for her back, no current sciatica  HCM-colonoscopy 2021-she did have a few polyps and repeat scope is due in 2026.  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  Mammogram due in March this year.  She has had her Hep A vaccines at pharmacy, only one documented in chart.  Has had 3 COVID vaccines but no booster.  She has not had shingles or seasonal flu vaccine.    She wears her seat belt, does not text and drive.    She sees dentist twice a yr and eye doctor annually.   She does exercise regularly, she hikes and does some regularly walking. Walks her dog twice a day, about 2 miles a day.   Does her best to eat a healthy diet, tries to avoid going out to eat.  She has lost about 10 lbs in  "past 6 weeks.    Does not smoke or drink.       The following portions of the patient's history were reviewed and updated as appropriate: allergies, current medications, past family history, past medical history, past social history, past surgical history and problem list.    Review of Systems   Constitutional: Negative for activity change, appetite change and unexpected weight change.   HENT: Positive for congestion. Negative for hearing loss, trouble swallowing and voice change.    Eyes: Negative for visual disturbance.   Respiratory: Positive for chest tightness. Negative for shortness of breath.    Cardiovascular: Positive for palpitations. Negative for chest pain and leg swelling.   Gastrointestinal: Negative for abdominal pain.   Genitourinary: Negative for difficulty urinating.   Musculoskeletal: Negative for arthralgias and back pain.   Allergic/Immunologic: Positive for environmental allergies.   Neurological: Negative for headaches.   Psychiatric/Behavioral: Negative for dysphoric mood and sleep disturbance. The patient is not nervous/anxious.        Objective   /80   Pulse 91   Temp 97.7 °F (36.5 °C)   Ht 163.1 cm (64.21\")   Wt 103 kg (228 lb)   SpO2 98%   BMI 38.88 kg/m²   Body mass index is 38.88 kg/m².  Physical Exam  Vitals and nursing note reviewed.   Constitutional:       General: She is not in acute distress.     Appearance: Normal appearance. She is well-developed. She is obese. She is not ill-appearing.      Comments: Kind and pleasant female, appears stated age and in NAD today   HENT:      Head: Normocephalic and atraumatic.      Right Ear: Ear canal and external ear normal.      Left Ear: Ear canal and external ear normal.      Ears:      Comments: TMs dull bilaterally     Nose: No congestion.      Comments: Turbinates slightly erythematous with clear rhinorrhea     Mouth/Throat:      Pharynx: No posterior oropharyngeal erythema.      Comments: Clear postnasal drip  Eyes:      " General:         Right eye: No discharge.         Left eye: No discharge.      Extraocular Movements: Extraocular movements intact.      Conjunctiva/sclera: Conjunctivae normal.      Pupils: Pupils are equal, round, and reactive to light.   Neck:      Thyroid: No thyromegaly.      Vascular: No carotid bruit.      Comments: No thyromegaly or mass  Cardiovascular:      Rate and Rhythm: Normal rate and regular rhythm.      Pulses: Normal pulses.      Heart sounds: Normal heart sounds. No murmur heard.  Pulmonary:      Effort: Pulmonary effort is normal. No respiratory distress.      Breath sounds: Normal breath sounds. No wheezing.   Chest:   Breasts:     Right: Normal.      Left: Normal.   Abdominal:      General: Bowel sounds are normal. There is no distension.      Palpations: Abdomen is soft.      Tenderness: There is no abdominal tenderness.   Musculoskeletal:         General: Normal range of motion.      Cervical back: Normal range of motion and neck supple.      Right lower leg: No edema.      Left lower leg: No edema.   Lymphadenopathy:      Cervical: No cervical adenopathy.      Upper Body:      Right upper body: No supraclavicular, axillary or pectoral adenopathy.      Left upper body: No supraclavicular, axillary or pectoral adenopathy.   Skin:     General: Skin is warm.      Findings: No rash.   Neurological:      General: No focal deficit present.      Mental Status: She is alert and oriented to person, place, and time. Mental status is at baseline.      Cranial Nerves: No cranial nerve deficit.      Motor: No weakness.      Coordination: Coordination normal.      Gait: Gait normal.   Psychiatric:         Mood and Affect: Mood normal.         Behavior: Behavior normal.         Thought Content: Thought content normal.         Judgment: Judgment normal.       ECG 12 Lead    Date/Time: 1/24/2023 12:09 PM  Performed by: Vermeesch, Marilyn K, MD  Authorized by: Vermeesch, Marilyn K, MD   Comparison: not  compared with previous ECG   Rhythm: sinus rhythm  Rate: normal  BPM: 75  Conduction: conduction normal  ST Segments: ST segments normal  T Waves: T waves normal  QRS axis: normal  Other: no other findings    Clinical impression: normal ECG              Assessment & Plan   Tracie Argueta is here today and the following problems have been addressed:      Diagnoses and all orders for this visit:    1. Physical exam, annual (Primary)  -     Mammo Screening Digital Tomosynthesis Bilateral With CAD; Future  -     CBC & Differential  -     Comprehensive Metabolic Panel  -     Lipid Panel With / Chol / HDL Ratio    2. Essential hypertension  -     CBC & Differential  -     Comprehensive Metabolic Panel  -     Lipid Panel With / Chol / HDL Ratio    3. Gastroesophageal reflux disease without esophagitis    4. Dysthymia    5. Screening mammogram, encounter for  -     Mammo Screening Digital Tomosynthesis Bilateral With CAD; Future    6. Seasonal allergic rhinitis due to pollen    7. Intermittent palpitations  -     TSH  -     T4, Free  -     ECG 12 Lead    Other orders  -     lisinopril (PRINIVIL,ZESTRIL) 10 MG tablet; Take 1 tablet by mouth Daily.  Dispense: 90 tablet; Refill: 1  -     DULoxetine (CYMBALTA) 30 MG capsule; Take 1 capsule by mouth Daily.  Dispense: 90 capsule; Refill: 1  -     Discontinue: montelukast (Singulair) 10 MG tablet; Take 1 tablet by mouth Every Night.  Dispense: 90 tablet; Refill: 0  -     FluLaval/Fluzone >6 mos ([id number])      Labs as noted  Recommend annual eye doctor appointment, or as necessary  See your dentist twice a year.  Recommend that you brush teeth twice daily and floss minimum of once daily  Recommend regular seatbelt use  Do not text or use phone while driving  Follow heart healthy/low salt diet  Avoid processed foods  Monitor blood pressure on occasion  EKG is currently normal  Exercise as tolerated up to 150 minutes per week-patient is active hiking and walking her dog  Take all  medications as prescribed  Continue Singulair and Xyzal for allergies  Her depression/anxiety is controlled on current dose of duloxetine  Continue daily vitamin D  Screening mammogram ordered  Flu vaccine provided    Return in about 4 weeks (around 2/21/2023) for Next scheduled follow up.  For follow-up of palpitations      Marilyn K. Vermeesch, MD      Part of this note may be an electronic transcription/translation of spoken language to printed text using the Dragon Dictation System.

## 2023-01-24 NOTE — TELEPHONE ENCOUNTER
From: Tracie Argueta  To: Marilyn K Vermeesch  Sent: 1/24/2023 10:38 AM EST  Subject: Singulair     Dr. Vermeesch, [facility] estimates delivery of Singulair for 2/2/23. I wanted to let you know since we are on a 4 week return to office timeline. If you prefer I start this sooner, a prescription to [facility] is requested.     thanks!  cristhian

## 2023-01-26 DIAGNOSIS — R74.8 ELEVATED ALKALINE PHOSPHATASE LEVEL: Primary | ICD-10-CM

## 2023-01-26 DIAGNOSIS — R73.9 ELEVATED BLOOD SUGAR: Primary | ICD-10-CM

## 2023-01-26 NOTE — PROGRESS NOTES
Please contact patient with lab results.  Please ask lab to add alkaline phosphatase and A1c to blood drawn yesterday.  Let patient know blood sugar is elevated at 108 and I have added an A1c to be certain she is not diabetic.  In addition alkaline phosphatase is again elevated and I have added additional lab work to determine if this is due to liver disease or arthritis.  I suspect this is likely due to fatty liver disease due to being overweight as this is the most common cause of elevated alkaline phosphatase in [place].  Please tell her it is important to follow a healthy diet and continue to exercise regularly to help bring down her blood sugar levels.  Cholesterol is minimally elevated at 205 with a slightly elevated bad cholesterol of 109.  Her good cholesterol is high though at 74 and is protective for her heart.  Complete blood count and thyroid function tests are normal.  Kidney function is normal.  We will contact her with above lab results when available.

## 2023-01-30 DIAGNOSIS — R73.9 ELEVATED BLOOD SUGAR: Primary | ICD-10-CM

## 2023-01-30 LAB
GGT SERPL-CCNC: 28 U/L (ref 5–36)
REQUEST PROBLEM: NORMAL

## 2023-01-30 NOTE — PROGRESS NOTES
Please let patient know that GGT level is normal suggesting that elevated alkaline phosphatase is likely due to underlying arthritis and not liver disease.  I am still awaiting A1c result.  Please contact lab and find out if they have run A1c or if they could please do so.  Thank you

## 2023-01-30 NOTE — PROGRESS NOTES
It appears as though they have canceled her A1c.  Do I need to reorder this and have her come back into clinic?  If so, please send me order to sign and ask patient to return to clinic as I would like an A1c on her.  Thank you

## 2023-01-31 LAB
HBA1C MFR BLD: 5.4 % (ref 4.8–5.6)
WRITTEN AUTHORIZATION: NORMAL

## 2023-01-31 NOTE — PROGRESS NOTES
Please cancel point-of-care A1c order.  They finally ran A1c 7 days ago.  Please let patient know it is 5.4 and consistent with normal blood sugar.

## 2023-02-21 ENCOUNTER — LAB (OUTPATIENT)
Dept: LAB | Facility: HOSPITAL | Age: 58
End: 2023-02-21
Payer: COMMERCIAL

## 2023-02-21 ENCOUNTER — HOSPITAL ENCOUNTER (OUTPATIENT)
Dept: GENERAL RADIOLOGY | Facility: HOSPITAL | Age: 58
Discharge: HOME | End: 2023-02-21
Payer: COMMERCIAL

## 2023-02-21 ENCOUNTER — OFFICE VISIT (OUTPATIENT)
Dept: INTERNAL MEDICINE | Facility: CLINIC | Age: 58
End: 2023-02-21
Payer: COMMERCIAL

## 2023-02-21 VITALS
OXYGEN SATURATION: 96 % | SYSTOLIC BLOOD PRESSURE: 132 MMHG | TEMPERATURE: 98 F | DIASTOLIC BLOOD PRESSURE: 76 MMHG | HEART RATE: 91 BPM | BODY MASS INDEX: 39.44 KG/M2 | WEIGHT: 231 LBS | HEIGHT: 64 IN

## 2023-02-21 DIAGNOSIS — R00.2 INTERMITTENT PALPITATIONS: Primary | ICD-10-CM

## 2023-02-21 DIAGNOSIS — R07.9 RIGHT-SIDED CHEST PAIN: ICD-10-CM

## 2023-02-21 LAB
D DIMER PPP FEU-MCNC: 0.4 MCGFEU/ML (ref 0–0.55)
GGT SERPL-CCNC: 19 U/L (ref 5–36)
HBA1C MFR BLD: 5.6 % (ref 4.8–5.6)
T4 FREE SERPL-MCNC: 1 NG/DL (ref 0.93–1.7)
TSH SERPL DL<=0.05 MIU/L-ACNC: 1.25 UIU/ML (ref 0.27–4.2)

## 2023-02-21 PROCEDURE — 71046 X-RAY EXAM CHEST 2 VIEWS: CPT

## 2023-02-21 PROCEDURE — 99214 OFFICE O/P EST MOD 30 MIN: CPT | Performed by: INTERNAL MEDICINE

## 2023-02-21 PROCEDURE — 84443 ASSAY THYROID STIM HORMONE: CPT | Performed by: INTERNAL MEDICINE

## 2023-02-21 PROCEDURE — 85379 FIBRIN DEGRADATION QUANT: CPT

## 2023-02-21 PROCEDURE — 82977 ASSAY OF GGT: CPT | Performed by: INTERNAL MEDICINE

## 2023-02-21 PROCEDURE — 84439 ASSAY OF FREE THYROXINE: CPT | Performed by: INTERNAL MEDICINE

## 2023-02-21 PROCEDURE — 83036 HEMOGLOBIN GLYCOSYLATED A1C: CPT | Performed by: INTERNAL MEDICINE

## 2023-02-21 PROCEDURE — 36415 COLL VENOUS BLD VENIPUNCTURE: CPT

## 2023-02-21 RX ORDER — METHYLPREDNISOLONE 4 MG/1
TABLET ORAL
Qty: 1 EACH | Refills: 0 | Status: SHIPPED | OUTPATIENT
Start: 2023-02-21

## 2023-02-21 RX ORDER — IBUPROFEN 600 MG/1
600 TABLET ORAL EVERY 8 HOURS PRN
Qty: 30 TABLET | Refills: 1 | Status: SHIPPED | OUTPATIENT
Start: 2023-02-21

## 2023-02-21 NOTE — PROGRESS NOTES
Chief Complaint   Patient presents with   • Follow-up     4 weeks for palpations. Pt states on her way here this morning, when she takes a deep breath there is a sharp pain above her right breast. Also states she started having upper right sided back pain this morning.      Subjective   Tracie Argueta is a 57 y.o. female.     History of Present Illness  Here today for follow-up of palpitations  Patient was here 4 weeks ago for annual exam and complained of occasional flip or palpitation in her heart.  Her EKG was normal at that time.    We resumed Singulair to help with chronic allergy symptoms to determine if that would help with her complaints of palpitations, anxiety and breathing.  She was told to return in 4 weeks for follow-up to determine if she had ongoing palpitations or not.  She was also told to monitor her blood pressure and heart rate when these episodes occurred.  Since last visit she states she has had 2 episodes of mild palpitations.  Her HR and BP were normal. She feels singulair has been helpful.   Her blood pressure and heart rate are in normal range today.  She states however that today she is having pain in her right upper chest and pain with deep breathing that radiates through to her right back.  She noted the pain in her back upon awakening and then in the front of chest.  She can feel it in her back just sitting still and it is very sharp in front of chest with deep breath.  No swelling of legs.  No recent road trip or flights.  No FH or personal history of DVT or PE.   She is not on OCP, no recent cough.  She thinks she had the flu a few weeks ago. She did not check herself for COVID.         The following portions of the patient's history were reviewed and updated as appropriate: allergies, current medications, past family history, past medical history, past social history, past surgical history and problem list.    Review of Systems   Constitutional: Negative for activity change, appetite  "change, chills, fever and unexpected weight change.   HENT: Negative.    Eyes: Negative for visual disturbance.   Respiratory: Negative for cough, shortness of breath and wheezing.    Cardiovascular: Positive for chest pain and palpitations. Negative for leg swelling.        Rare palpitations, see HPI   Gastrointestinal: Negative for abdominal pain, nausea and vomiting.   Musculoskeletal: Positive for back pain.        Right upper back pain below scapula   Neurological: Negative for headaches.   Psychiatric/Behavioral: Negative for dysphoric mood and sleep disturbance.       Objective   /76   Pulse 91   Temp 98 °F (36.7 °C)   Ht 163.1 cm (64.21\")   Wt 105 kg (231 lb)   SpO2 96%   BMI 39.39 kg/m²   Body mass index is 39.39 kg/m².  Physical Exam  Vitals and nursing note reviewed.   Constitutional:       General: She is not in acute distress.     Appearance: Normal appearance. She is well-developed. She is obese. She is not ill-appearing.      Comments: Kind and pleasant female, appears stated age and in NAD today   HENT:      Head: Atraumatic.      Right Ear: External ear normal.      Left Ear: External ear normal.   Eyes:      General:         Right eye: No discharge.         Left eye: No discharge.      Extraocular Movements: Extraocular movements intact.   Neck:      Thyroid: No thyromegaly.   Cardiovascular:      Rate and Rhythm: Normal rate and regular rhythm.      Pulses: Normal pulses.      Heart sounds: Normal heart sounds. No murmur heard.  Pulmonary:      Effort: Pulmonary effort is normal. No respiratory distress.      Breath sounds: Normal breath sounds. No wheezing.   Musculoskeletal:      Cervical back: Normal range of motion.      Right lower leg: No edema.      Left lower leg: No edema.   Skin:     General: Skin is warm.      Findings: Bruising present.      Comments: Several bruises noted over lower extremity, no redness or warmth of lower extremities   Neurological:      General: No focal " deficit present.      Mental Status: She is alert and oriented to person, place, and time. Mental status is at baseline.      Cranial Nerves: No cranial nerve deficit.      Motor: No weakness.      Coordination: Coordination normal.      Gait: Gait normal.   Psychiatric:         Mood and Affect: Mood normal.         Behavior: Behavior normal.         Thought Content: Thought content normal.         Judgment: Judgment normal.         Assessment & Plan   Tracie Argueta is here today and the following problems have been addressed:      Diagnoses and all orders for this visit:    1. Intermittent palpitations (Primary)    2. Right-sided chest pain  -     D-dimer, Quantitative; Future  -     XR Chest PA & Lateral; Future    Minimal palpitations per patient, not worrisome at this time, last EKG normal  Patient relates palpitations to probable anxiety issues  She is complaining of right-sided chest pain that radiates through to her back and is worse with deep breath, this started this morning  No history of PE or DVT personally or in family  Patient had history of flu like symptoms 2 weeks ago, this could be bronchitis or pneumonia or PE  Will check chest x-ray and D-dimer and contact patient with results.  If D-dimer is elevated will order stat CT of lungs  If D-dimer is negative and chest x-ray is normal, will treat patient with anti-inflammatories and short course of steroids    Return to clinic in 3 months or as needed    Part of this note may be an electronic transcription/translation of spoken language to printed text using the Dragon Dictation System.    Answers for HPI/ROS submitted by the patient on 2/20/2023  Please describe your symptoms.: Follow-up to discuss if treatment (incorporating Singulair) has improved chest related symptoms. The systems have been occasional fluttering and/or sensations that feel like I need to 'catch my breath'. I was also supposed to check BP regularly to determine if there was a  connection. Only 2 episodes in the last month. Brief and did not last.  Have you had these symptoms before?: No  How long have you been having these symptoms?: Greater than 2 weeks  Please list any medications you are currently taking for this condition.: Dr. Vermeesch asked me to resume taking Singulair.  Please describe any probable cause for these symptoms. : possible causes that were discussed at last visit include:  reflux, heart, bronchial inflammation.  BP measurements recorded in the last month are listed below., , 1.26.23  116/69, 1.29.23   136/88, 2.4.23   122/70, 2.13.23   120/67, 2.19.23    138/30  What is the primary reason for your visit?: Other

## 2023-02-21 NOTE — PROGRESS NOTES
Please call patient and tell her that chest x-ray reveals no evidence of pneumonia and D-dimer is in normal range.  This suggests that she does not have infection and no blood clot.  I suspect she may have pleurisy or pain of left lung possibly due to flu that she had a few weeks ago.  Please tell her I have sent in ibuprofen to take 2-3 times daily on full stomach of food as well as a Medrol Dosepak.  Please tell her to take Medrol Dosepak as directed, she missed morning dose with breakfast but may take morning dose with lunch today.  Please tell her if her symptoms worsen significantly and she is very short of breath that she should seek care at emergency room.  Thank you

## 2023-04-18 ENCOUNTER — HOSPITAL ENCOUNTER (OUTPATIENT)
Dept: MAMMOGRAPHY | Facility: HOSPITAL | Age: 58
Discharge: HOME | End: 2023-04-18
Admitting: INTERNAL MEDICINE
Payer: COMMERCIAL

## 2023-04-18 DIAGNOSIS — Z12.31 SCREENING MAMMOGRAM, ENCOUNTER FOR: ICD-10-CM

## 2023-04-18 DIAGNOSIS — Z00.00 PHYSICAL EXAM, ANNUAL: ICD-10-CM

## 2023-04-18 PROCEDURE — 77067 SCR MAMMO BI INCL CAD: CPT

## 2023-04-18 PROCEDURE — 77063 BREAST TOMOSYNTHESIS BI: CPT

## 2023-08-04 ENCOUNTER — OFFICE VISIT (OUTPATIENT)
Dept: INTERNAL MEDICINE | Facility: CLINIC | Age: 58
End: 2023-08-04
Payer: COMMERCIAL

## 2023-08-04 VITALS
OXYGEN SATURATION: 98 % | BODY MASS INDEX: 39.88 KG/M2 | WEIGHT: 233.6 LBS | SYSTOLIC BLOOD PRESSURE: 140 MMHG | DIASTOLIC BLOOD PRESSURE: 88 MMHG | TEMPERATURE: 97.1 F | HEART RATE: 75 BPM | HEIGHT: 64 IN

## 2023-08-04 DIAGNOSIS — I10 ESSENTIAL HYPERTENSION: ICD-10-CM

## 2023-08-04 DIAGNOSIS — Z23 NEED FOR TETANUS, DIPHTHERIA, AND ACELLULAR PERTUSSIS (TDAP) VACCINE: Primary | ICD-10-CM

## 2023-11-14 RX ORDER — LISINOPRIL 10 MG/1
10 TABLET ORAL DAILY
Qty: 90 TABLET | Refills: 1 | Status: SHIPPED | OUTPATIENT
Start: 2023-11-14

## 2023-11-14 NOTE — TELEPHONE ENCOUNTER
Rx Refill Note  Requested Prescriptions     Pending Prescriptions Disp Refills    lisinopril (PRINIVIL,ZESTRIL) 10 MG tablet 90 tablet 1     Sig: Take 1 tablet by mouth Daily.      Last office visit with prescribing clinician: 8/4/2023   Last telemedicine visit with prescribing clinician: Visit date not found   Next office visit with prescribing clinician: 2/5/2024                         Would you like a call back once the refill request has been completed: [] Yes [] No    If the office needs to give you a call back, can they leave a voicemail: [] Yes [] No    Jagruti Hill MA  11/14/23, 10:25 EST

## 2023-11-14 NOTE — TELEPHONE ENCOUNTER
Caller: Tracie Argueta    Relationship: Self    Best call back number: [phone]     Requested Prescriptions:   Requested Prescriptions     Pending Prescriptions Disp Refills    lisinopril (PRINIVIL,ZESTRIL) 10 MG tablet 90 tablet 1     Sig: Take 1 tablet by mouth Daily.        Pharmacy where request should be sent: [facility] [address] [phone] [facility] [phone]      Last office visit with prescribing clinician: 8/4/2023   Last telemedicine visit with prescribing clinician: Visit date not found   Next office visit with prescribing clinician: 2/5/2024     Does the patient have less than a 3 day supply:  [] Yes  [x] No    Would you like a call back once the refill request has been completed: [] Yes [x] No    If the office needs to give you a call back, can they leave a voicemail: [] Yes [x] No    Nicolle Joshua Rep   11/14/23 08:27 EST

## 2023-12-04 ENCOUNTER — OFFICE VISIT (OUTPATIENT)
Dept: INTERNAL MEDICINE | Facility: CLINIC | Age: 58
End: 2023-12-04
Payer: COMMERCIAL

## 2023-12-04 VITALS
WEIGHT: 246.2 LBS | HEART RATE: 77 BPM | BODY MASS INDEX: 42.03 KG/M2 | DIASTOLIC BLOOD PRESSURE: 82 MMHG | HEIGHT: 64 IN | OXYGEN SATURATION: 98 % | SYSTOLIC BLOOD PRESSURE: 124 MMHG | TEMPERATURE: 97.1 F

## 2023-12-04 DIAGNOSIS — R05.1 ACUTE COUGH: ICD-10-CM

## 2023-12-04 DIAGNOSIS — Z71.3 WEIGHT LOSS COUNSELING, ENCOUNTER FOR: Primary | ICD-10-CM

## 2023-12-04 DIAGNOSIS — H65.02 NON-RECURRENT ACUTE SEROUS OTITIS MEDIA OF LEFT EAR: ICD-10-CM

## 2023-12-04 DIAGNOSIS — E66.01 MORBID OBESITY: ICD-10-CM

## 2023-12-04 LAB
EXPIRATION DATE: NORMAL
EXPIRATION DATE: NORMAL
FLUAV AG UPPER RESP QL IA.RAPID: NOT DETECTED
FLUBV AG UPPER RESP QL IA.RAPID: NOT DETECTED
INTERNAL CONTROL: NORMAL
INTERNAL CONTROL: NORMAL
Lab: NORMAL
Lab: NORMAL
S PYO AG THROAT QL: NEGATIVE
SARS-COV-2 AG UPPER RESP QL IA.RAPID: NOT DETECTED

## 2023-12-04 RX ORDER — AZITHROMYCIN 250 MG/1
TABLET, FILM COATED ORAL
Qty: 6 TABLET | Refills: 0 | Status: SHIPPED | OUTPATIENT
Start: 2023-12-04

## 2023-12-04 NOTE — PROGRESS NOTES
Subjective   Tracie Argueta is a 58 y.o. female.     History of Present Illness  Patient presents for follow-up on obesity and complaints of sinus symptoms  3 days of sinus pressure and drainage.  Head fullness.  Frontal headache.  Dry cough.  No fever or chills.  Patient interested in weight loss.  States she has tried many things in the past and will do pretty well but then will plateau and the weight will come back on.  She is conscientious of what she eats and exercise.      The following portions of the patient's history were reviewed and updated as appropriate: allergies, current medications, past family history, past medical history, past social history, past surgical history, and problem list.    Review of Systems   All other systems reviewed and are negative.      Objective   Physical Exam  Vitals and nursing note reviewed.   Constitutional:       Appearance: Normal appearance.   HENT:      Head: Normocephalic and atraumatic.      Right Ear: External ear normal.      Left Ear: External ear normal.      Nose: Nose normal.      Mouth/Throat:      Mouth: Mucous membranes are moist.      Pharynx: Oropharynx is clear. No oropharyngeal exudate or posterior oropharyngeal erythema.   Eyes:      Extraocular Movements: Extraocular movements intact.      Conjunctiva/sclera: Conjunctivae normal.      Pupils: Pupils are equal, round, and reactive to light.   Cardiovascular:      Rate and Rhythm: Normal rate and regular rhythm.      Pulses: Normal pulses.      Heart sounds: Normal heart sounds.   Pulmonary:      Effort: Pulmonary effort is normal.      Breath sounds: Normal breath sounds.   Abdominal:      General: Abdomen is flat. Bowel sounds are normal.      Palpations: Abdomen is soft.   Musculoskeletal:         General: Normal range of motion.      Cervical back: Normal range of motion.   Skin:     General: Skin is warm.      Capillary Refill: Capillary refill takes less than 2 seconds.   Neurological:      General:  No focal deficit present.      Mental Status: She is alert and oriented to person, place, and time. Mental status is at baseline.   Psychiatric:         Mood and Affect: Mood normal.         Behavior: Behavior normal.         Thought Content: Thought content normal.         Judgment: Judgment normal.         Assessment & Plan   Diagnoses and all orders for this visit:    1. Weight loss counseling, encounter for (Primary)  -     Tirzepatide 2.5 MG/0.5ML solution pen-injector; Inject 0.5 mL under the skin into the appropriate area as directed 1 (One) Time Per Week.  Dispense: 2 mL; Refill: 3    2. Morbid obesity  -     Tirzepatide 2.5 MG/0.5ML solution pen-injector; Inject 0.5 mL under the skin into the appropriate area as directed 1 (One) Time Per Week.  Dispense: 2 mL; Refill: 3    3. Acute cough  -     POCT rapid strep A  -     POCT SARS-CoV-2 Antigen JULIANNA + Flu    4. Non-recurrent acute serous otitis media of left ear  -     azithromycin (Zithromax Z-Calos) 250 MG tablet; Take 2 tablets by mouth on day 1, then 1 tablet daily on days 2-5  Dispense: 6 tablet; Refill: 0       Counseled on weight loss methods.  Decided to go with Zeound.  Counseled on risks and benefits.  Counseled on maintaining increase protein in diet to prevent hair loss and lean muscle mass loss.  Z-Calos for ear infection  Flu strep and COVID all negative

## 2023-12-26 ENCOUNTER — PRIOR AUTHORIZATION (OUTPATIENT)
Dept: INTERNAL MEDICINE | Facility: CLINIC | Age: 58
End: 2023-12-26
Payer: COMMERCIAL

## 2024-02-05 ENCOUNTER — OFFICE VISIT (OUTPATIENT)
Dept: INTERNAL MEDICINE | Facility: CLINIC | Age: 59
End: 2024-02-05
Payer: COMMERCIAL

## 2024-02-05 VITALS
OXYGEN SATURATION: 99 % | TEMPERATURE: 96.6 F | WEIGHT: 251.8 LBS | BODY MASS INDEX: 42.99 KG/M2 | HEART RATE: 81 BPM | SYSTOLIC BLOOD PRESSURE: 138 MMHG | HEIGHT: 64 IN | DIASTOLIC BLOOD PRESSURE: 88 MMHG

## 2024-02-05 DIAGNOSIS — Z00.00 PHYSICAL EXAM, ANNUAL: ICD-10-CM

## 2024-02-05 DIAGNOSIS — J34.89 SINUS PRESSURE: ICD-10-CM

## 2024-02-05 DIAGNOSIS — Z23 NEED FOR INFLUENZA VACCINATION: Primary | ICD-10-CM

## 2024-02-05 LAB
EXPIRATION DATE: NORMAL
EXPIRATION DATE: NORMAL
FLUAV AG NPH QL: NEGATIVE
FLUBV AG NPH QL: NEGATIVE
INTERNAL CONTROL: NORMAL
INTERNAL CONTROL: NORMAL
Lab: NORMAL
Lab: NORMAL
SARS-COV-2 AG UPPER RESP QL IA.RAPID: NOT DETECTED

## 2024-02-05 PROCEDURE — 87804 INFLUENZA ASSAY W/OPTIC: CPT | Performed by: STUDENT IN AN ORGANIZED HEALTH CARE EDUCATION/TRAINING PROGRAM

## 2024-02-05 PROCEDURE — 87426 SARSCOV CORONAVIRUS AG IA: CPT | Performed by: STUDENT IN AN ORGANIZED HEALTH CARE EDUCATION/TRAINING PROGRAM

## 2024-02-05 PROCEDURE — 99396 PREV VISIT EST AGE 40-64: CPT | Performed by: STUDENT IN AN ORGANIZED HEALTH CARE EDUCATION/TRAINING PROGRAM

## 2024-02-05 PROCEDURE — 90471 IMMUNIZATION ADMIN: CPT | Performed by: STUDENT IN AN ORGANIZED HEALTH CARE EDUCATION/TRAINING PROGRAM

## 2024-02-05 PROCEDURE — 90686 IIV4 VACC NO PRSV 0.5 ML IM: CPT | Performed by: STUDENT IN AN ORGANIZED HEALTH CARE EDUCATION/TRAINING PROGRAM

## 2024-02-05 NOTE — PROGRESS NOTES
Subjective   Tracie Argueta is a 58 y.o. female.     History of Present Illness  Patient presents for annual exam  Vaccines up-to-date except for flu  Pap smear due in December  Mammogram due in April  Colorectal cancer screening due in 2031    Patient has complaints of 2 to 3 hours of sinus pressure      The following portions of the patient's history were reviewed and updated as appropriate: allergies, current medications, past family history, past medical history, past social history, past surgical history, and problem list.    Review of Systems   All other systems reviewed and are negative.      Objective   Physical Exam  Vitals and nursing note reviewed.   Constitutional:       Appearance: Normal appearance.   HENT:      Head: Normocephalic and atraumatic.      Right Ear: External ear normal.      Left Ear: External ear normal.      Nose: Nose normal.      Mouth/Throat:      Mouth: Mucous membranes are moist.      Pharynx: Oropharynx is clear. No oropharyngeal exudate or posterior oropharyngeal erythema.   Eyes:      Extraocular Movements: Extraocular movements intact.      Conjunctiva/sclera: Conjunctivae normal.      Pupils: Pupils are equal, round, and reactive to light.   Cardiovascular:      Rate and Rhythm: Normal rate and regular rhythm.      Pulses: Normal pulses.      Heart sounds: Normal heart sounds.   Pulmonary:      Effort: Pulmonary effort is normal.      Breath sounds: Normal breath sounds.   Abdominal:      General: Abdomen is flat. Bowel sounds are normal.      Palpations: Abdomen is soft.   Musculoskeletal:         General: Normal range of motion.      Cervical back: Normal range of motion.   Skin:     General: Skin is warm.      Capillary Refill: Capillary refill takes less than 2 seconds.   Neurological:      General: No focal deficit present.      Mental Status: She is alert and oriented to person, place, and time. Mental status is at baseline.   Psychiatric:         Mood and Affect: Mood  normal.         Behavior: Behavior normal.         Thought Content: Thought content normal.         Judgment: Judgment normal.         Assessment & Plan   Diagnoses and all orders for this visit:    1. Need for influenza vaccination (Primary)  -     Fluzone >6 Months ([id number])    2. Physical exam, annual  -     Comprehensive Metabolic Panel  -     CBC (No Diff)  -     Lipid Panel  -     TSH  -     Vitamin B12  -     Vitamin D,25-Hydroxy  -     T3, Free  -     T4, Free    3. Sinus pressure  -     POCT Influenza A/B  -     POCT SARS-CoV-2 Antigen JULIANNA       Flu covid neg  Lab today  Flu vaccine today  Optometry yearly  Dentist twice a year  Heart healthy diet  Exercise up to 150 minutes weekly

## 2024-02-06 LAB
25(OH)D3+25(OH)D2 SERPL-MCNC: 34.9 NG/ML (ref 30–100)
ALBUMIN SERPL-MCNC: 4.4 G/DL (ref 3.5–5.2)
ALBUMIN/GLOB SERPL: 1.8 G/DL
ALP SERPL-CCNC: 109 U/L (ref 39–117)
ALT SERPL-CCNC: 24 U/L (ref 1–33)
AST SERPL-CCNC: 23 U/L (ref 1–32)
BILIRUB SERPL-MCNC: 0.7 MG/DL (ref 0–1.2)
BUN SERPL-MCNC: 11 MG/DL (ref 6–20)
BUN/CREAT SERPL: 15.5 (ref 7–25)
CALCIUM SERPL-MCNC: 8.9 MG/DL (ref 8.6–10.5)
CHLORIDE SERPL-SCNC: 104 MMOL/L (ref 98–107)
CHOLEST SERPL-MCNC: 179 MG/DL (ref 0–200)
CO2 SERPL-SCNC: 23.4 MMOL/L (ref 22–29)
CREAT SERPL-MCNC: 0.71 MG/DL (ref 0.57–1)
EGFRCR SERPLBLD CKD-EPI 2021: 99.9 ML/MIN/1.73
ERYTHROCYTE [DISTWIDTH] IN BLOOD BY AUTOMATED COUNT: 11.9 % (ref 12.3–15.4)
GLOBULIN SER CALC-MCNC: 2.4 GM/DL
GLUCOSE SERPL-MCNC: 112 MG/DL (ref 65–99)
HCT VFR BLD AUTO: 38.1 % (ref 34–46.6)
HDLC SERPL-MCNC: 69 MG/DL (ref 40–60)
HGB BLD-MCNC: 12.7 G/DL (ref 12–15.9)
LDLC SERPL CALC-MCNC: 87 MG/DL (ref 0–100)
MCH RBC QN AUTO: 29.3 PG (ref 26.6–33)
MCHC RBC AUTO-ENTMCNC: 33.3 G/DL (ref 31.5–35.7)
MCV RBC AUTO: 87.8 FL (ref 79–97)
PLATELET # BLD AUTO: 243 10*3/MM3 (ref 140–450)
POTASSIUM SERPL-SCNC: 4.6 MMOL/L (ref 3.5–5.2)
PROT SERPL-MCNC: 6.8 G/DL (ref 6–8.5)
RBC # BLD AUTO: 4.34 10*6/MM3 (ref 3.77–5.28)
SODIUM SERPL-SCNC: 139 MMOL/L (ref 136–145)
T3FREE SERPL-MCNC: 3.8 PG/ML (ref 2–4.4)
T4 FREE SERPL-MCNC: 1.35 NG/DL (ref 0.93–1.7)
TRIGL SERPL-MCNC: 134 MG/DL (ref 0–150)
TSH SERPL DL<=0.005 MIU/L-ACNC: 1.67 UIU/ML (ref 0.27–4.2)
VIT B12 SERPL-MCNC: 507 PG/ML (ref 211–946)
VLDLC SERPL CALC-MCNC: 23 MG/DL (ref 5–40)
WBC # BLD AUTO: 4.36 10*3/MM3 (ref 3.4–10.8)

## 2024-04-23 ENCOUNTER — TRANSCRIBE ORDERS (OUTPATIENT)
Dept: INTERNAL MEDICINE | Facility: CLINIC | Age: 59
End: 2024-04-23
Payer: COMMERCIAL

## 2024-04-23 DIAGNOSIS — Z12.31 ENCOUNTER FOR SCREENING MAMMOGRAM FOR BREAST CANCER: Primary | ICD-10-CM

## 2024-04-24 ENCOUNTER — HOSPITAL ENCOUNTER (OUTPATIENT)
Dept: MAMMOGRAPHY | Facility: HOSPITAL | Age: 59
Discharge: HOME | End: 2024-04-24
Admitting: STUDENT IN AN ORGANIZED HEALTH CARE EDUCATION/TRAINING PROGRAM
Payer: COMMERCIAL

## 2024-04-24 DIAGNOSIS — Z12.31 ENCOUNTER FOR SCREENING MAMMOGRAM FOR BREAST CANCER: ICD-10-CM

## 2024-04-24 PROCEDURE — 77067 SCR MAMMO BI INCL CAD: CPT

## 2024-04-24 PROCEDURE — 77063 BREAST TOMOSYNTHESIS BI: CPT

## 2024-05-13 RX ORDER — LISINOPRIL 10 MG/1
10 TABLET ORAL DAILY
Qty: 90 TABLET | Refills: 3 | Status: SHIPPED | OUTPATIENT
Start: 2024-05-13

## 2024-07-24 ENCOUNTER — TRANSCRIBE ORDERS (OUTPATIENT)
Dept: ADMINISTRATIVE | Facility: HOSPITAL | Age: 59
End: 2024-07-24
Payer: COMMERCIAL

## 2024-07-24 DIAGNOSIS — Z78.0 ASYMPTOMATIC MENOPAUSAL STATE: Primary | ICD-10-CM

## 2025-03-18 ENCOUNTER — TRANSCRIBE ORDERS (OUTPATIENT)
Dept: ADMINISTRATIVE | Facility: HOSPITAL | Age: 60
End: 2025-03-18
Payer: COMMERCIAL

## 2025-03-18 DIAGNOSIS — Z12.31 ENCOUNTER FOR SCREENING MAMMOGRAM FOR BREAST CANCER: Primary | ICD-10-CM

## 2025-04-26 LAB
NCCN CRITERIA FLAG: NORMAL
TYRER CUZICK SCORE: 5

## 2025-05-01 ENCOUNTER — HOSPITAL ENCOUNTER (OUTPATIENT)
Facility: HOSPITAL | Age: 60
Discharge: HOME | End: 2025-05-01
Admitting: STUDENT IN AN ORGANIZED HEALTH CARE EDUCATION/TRAINING PROGRAM
Payer: COMMERCIAL

## 2025-05-01 DIAGNOSIS — Z12.31 ENCOUNTER FOR SCREENING MAMMOGRAM FOR BREAST CANCER: ICD-10-CM

## 2025-05-01 PROCEDURE — 77067 SCR MAMMO BI INCL CAD: CPT

## 2025-05-01 PROCEDURE — 77063 BREAST TOMOSYNTHESIS BI: CPT

## 2025-06-17 ENCOUNTER — HOSPITAL ENCOUNTER (OUTPATIENT)
Facility: HOSPITAL | Age: 60
Discharge: HOME | End: 2025-06-17
Payer: COMMERCIAL

## 2025-06-17 DIAGNOSIS — R92.8 ABNORMAL MAMMOGRAM: ICD-10-CM

## 2025-06-17 PROCEDURE — 76642 ULTRASOUND BREAST LIMITED: CPT | Performed by: RADIOLOGY

## 2025-06-17 PROCEDURE — 77065 DX MAMMO INCL CAD UNI: CPT | Performed by: RADIOLOGY

## 2025-06-17 PROCEDURE — 77065 DX MAMMO INCL CAD UNI: CPT

## 2025-06-17 PROCEDURE — 76642 ULTRASOUND BREAST LIMITED: CPT
